# Patient Record
Sex: FEMALE | Race: OTHER | NOT HISPANIC OR LATINO
[De-identification: names, ages, dates, MRNs, and addresses within clinical notes are randomized per-mention and may not be internally consistent; named-entity substitution may affect disease eponyms.]

---

## 2018-03-02 ENCOUNTER — APPOINTMENT (OUTPATIENT)
Dept: ANTEPARTUM | Facility: CLINIC | Age: 33
End: 2018-03-02

## 2018-03-09 ENCOUNTER — ASOB RESULT (OUTPATIENT)
Age: 33
End: 2018-03-09

## 2018-03-09 ENCOUNTER — APPOINTMENT (OUTPATIENT)
Dept: ANTEPARTUM | Facility: CLINIC | Age: 33
End: 2018-03-09
Payer: COMMERCIAL

## 2018-03-09 PROCEDURE — 76811 OB US DETAILED SNGL FETUS: CPT

## 2018-03-09 PROCEDURE — 76817 TRANSVAGINAL US OBSTETRIC: CPT

## 2018-03-12 ENCOUNTER — APPOINTMENT (OUTPATIENT)
Dept: ANTEPARTUM | Facility: CLINIC | Age: 33
End: 2018-03-12

## 2018-03-16 ENCOUNTER — APPOINTMENT (OUTPATIENT)
Dept: ANTEPARTUM | Facility: CLINIC | Age: 33
End: 2018-03-16
Payer: COMMERCIAL

## 2018-03-16 ENCOUNTER — ASOB RESULT (OUTPATIENT)
Age: 33
End: 2018-03-16

## 2018-03-16 PROCEDURE — 76816 OB US FOLLOW-UP PER FETUS: CPT

## 2018-07-16 ENCOUNTER — APPOINTMENT (OUTPATIENT)
Dept: ANTEPARTUM | Facility: CLINIC | Age: 33
End: 2018-07-16

## 2018-07-22 ENCOUNTER — TRANSCRIPTION ENCOUNTER (OUTPATIENT)
Age: 33
End: 2018-07-22

## 2018-07-23 ENCOUNTER — INPATIENT (INPATIENT)
Facility: HOSPITAL | Age: 33
LOS: 1 days | Discharge: HOME | End: 2018-07-24
Attending: OBSTETRICS & GYNECOLOGY | Admitting: OBSTETRICS & GYNECOLOGY

## 2018-07-23 VITALS — TEMPERATURE: 98 F | SYSTOLIC BLOOD PRESSURE: 133 MMHG | DIASTOLIC BLOOD PRESSURE: 76 MMHG | HEART RATE: 73 BPM

## 2018-07-23 DIAGNOSIS — Z3A.00 WEEKS OF GESTATION OF PREGNANCY NOT SPECIFIED: ICD-10-CM

## 2018-07-23 DIAGNOSIS — O26.899 OTHER SPECIFIED PREGNANCY RELATED CONDITIONS, UNSPECIFIED TRIMESTER: ICD-10-CM

## 2018-07-23 LAB
BASOPHILS # BLD AUTO: 0.03 K/UL — SIGNIFICANT CHANGE UP (ref 0–0.2)
BASOPHILS NFR BLD AUTO: 0.2 % — SIGNIFICANT CHANGE UP (ref 0–2)
BLD GP AB SCN SERPL QL: NEGATIVE — SIGNIFICANT CHANGE UP
EOSINOPHIL # BLD AUTO: 0.03 K/UL — SIGNIFICANT CHANGE UP (ref 0–0.5)
EOSINOPHIL NFR BLD AUTO: 0.2 % — SIGNIFICANT CHANGE UP (ref 0–6)
HCT VFR BLD CALC: 42.3 % — SIGNIFICANT CHANGE UP (ref 34.5–45)
HGB BLD-MCNC: 14 G/DL — SIGNIFICANT CHANGE UP (ref 11.5–15.5)
IMM GRANULOCYTES # BLD AUTO: 0.09 # — SIGNIFICANT CHANGE UP
IMM GRANULOCYTES NFR BLD AUTO: 0.6 % — SIGNIFICANT CHANGE UP (ref 0–1.5)
LYMPHOCYTES # BLD AUTO: 1.69 K/UL — SIGNIFICANT CHANGE UP (ref 1–3.3)
LYMPHOCYTES # BLD AUTO: 12.1 % — LOW (ref 13–44)
MCHC RBC-ENTMCNC: 30.4 PG — SIGNIFICANT CHANGE UP (ref 27–34)
MCHC RBC-ENTMCNC: 33.1 % — SIGNIFICANT CHANGE UP (ref 32–36)
MCV RBC AUTO: 92 FL — SIGNIFICANT CHANGE UP (ref 80–100)
MONOCYTES # BLD AUTO: 1.09 K/UL — HIGH (ref 0–0.9)
MONOCYTES NFR BLD AUTO: 7.8 % — SIGNIFICANT CHANGE UP (ref 2–14)
NEUTROPHILS # BLD AUTO: 10.98 K/UL — HIGH (ref 1.8–7.4)
NEUTROPHILS NFR BLD AUTO: 79.1 % — HIGH (ref 43–77)
NRBC # FLD: 0 — SIGNIFICANT CHANGE UP
PLATELET # BLD AUTO: 159 K/UL — SIGNIFICANT CHANGE UP (ref 150–400)
PMV BLD: 11.3 FL — SIGNIFICANT CHANGE UP (ref 7–13)
RBC # BLD: 4.6 M/UL — SIGNIFICANT CHANGE UP (ref 3.8–5.2)
RBC # FLD: 13.6 % — SIGNIFICANT CHANGE UP (ref 10.3–14.5)
RH IG SCN BLD-IMP: POSITIVE — SIGNIFICANT CHANGE UP
WBC # BLD: 13.91 K/UL — HIGH (ref 3.8–10.5)
WBC # FLD AUTO: 13.91 K/UL — HIGH (ref 3.8–10.5)

## 2018-07-23 RX ORDER — PRAMOXINE HYDROCHLORIDE 150 MG/15G
1 AEROSOL, FOAM RECTAL EVERY 4 HOURS
Qty: 0 | Refills: 0 | Status: DISCONTINUED | OUTPATIENT
Start: 2018-07-23 | End: 2018-07-24

## 2018-07-23 RX ORDER — OXYTOCIN 10 UNIT/ML
333.33 VIAL (ML) INJECTION
Qty: 20 | Refills: 0 | Status: DISCONTINUED | OUTPATIENT
Start: 2018-07-23 | End: 2018-07-23

## 2018-07-23 RX ORDER — DOCUSATE SODIUM 100 MG
100 CAPSULE ORAL
Qty: 0 | Refills: 0 | Status: DISCONTINUED | OUTPATIENT
Start: 2018-07-23 | End: 2018-07-24

## 2018-07-23 RX ORDER — DIBUCAINE 1 %
1 OINTMENT (GRAM) RECTAL EVERY 4 HOURS
Qty: 0 | Refills: 0 | Status: DISCONTINUED | OUTPATIENT
Start: 2018-07-23 | End: 2018-07-24

## 2018-07-23 RX ORDER — HYDROCORTISONE 1 %
1 OINTMENT (GRAM) TOPICAL EVERY 4 HOURS
Qty: 0 | Refills: 0 | Status: DISCONTINUED | OUTPATIENT
Start: 2018-07-23 | End: 2018-07-24

## 2018-07-23 RX ORDER — ACETAMINOPHEN 500 MG
975 TABLET ORAL EVERY 6 HOURS
Qty: 0 | Refills: 0 | Status: DISCONTINUED | OUTPATIENT
Start: 2018-07-23 | End: 2018-07-24

## 2018-07-23 RX ORDER — LANOLIN
1 OINTMENT (GRAM) TOPICAL EVERY 6 HOURS
Qty: 0 | Refills: 0 | Status: DISCONTINUED | OUTPATIENT
Start: 2018-07-23 | End: 2018-07-24

## 2018-07-23 RX ORDER — OXYCODONE AND ACETAMINOPHEN 5; 325 MG/1; MG/1
2 TABLET ORAL EVERY 6 HOURS
Qty: 0 | Refills: 0 | Status: DISCONTINUED | OUTPATIENT
Start: 2018-07-23 | End: 2018-07-23

## 2018-07-23 RX ORDER — AER TRAVELER 0.5 G/1
1 SOLUTION RECTAL; TOPICAL EVERY 4 HOURS
Qty: 0 | Refills: 0 | Status: DISCONTINUED | OUTPATIENT
Start: 2018-07-23 | End: 2018-07-24

## 2018-07-23 RX ORDER — OXYCODONE HYDROCHLORIDE 5 MG/1
5 TABLET ORAL
Qty: 0 | Refills: 0 | Status: DISCONTINUED | OUTPATIENT
Start: 2018-07-23 | End: 2018-07-24

## 2018-07-23 RX ORDER — SODIUM CHLORIDE 9 MG/ML
1000 INJECTION, SOLUTION INTRAVENOUS
Qty: 0 | Refills: 0 | Status: DISCONTINUED | OUTPATIENT
Start: 2018-07-23 | End: 2018-07-23

## 2018-07-23 RX ORDER — ACETAMINOPHEN 500 MG
650 TABLET ORAL EVERY 6 HOURS
Qty: 0 | Refills: 0 | Status: DISCONTINUED | OUTPATIENT
Start: 2018-07-23 | End: 2018-07-23

## 2018-07-23 RX ORDER — MAGNESIUM HYDROXIDE 400 MG/1
30 TABLET, CHEWABLE ORAL
Qty: 0 | Refills: 0 | Status: DISCONTINUED | OUTPATIENT
Start: 2018-07-23 | End: 2018-07-24

## 2018-07-23 RX ORDER — OXYCODONE HYDROCHLORIDE 5 MG/1
5 TABLET ORAL EVERY 4 HOURS
Qty: 0 | Refills: 0 | Status: DISCONTINUED | OUTPATIENT
Start: 2018-07-23 | End: 2018-07-24

## 2018-07-23 RX ORDER — SODIUM CHLORIDE 9 MG/ML
1000 INJECTION, SOLUTION INTRAVENOUS ONCE
Qty: 0 | Refills: 0 | Status: DISCONTINUED | OUTPATIENT
Start: 2018-07-23 | End: 2018-07-23

## 2018-07-23 RX ORDER — DIPHENHYDRAMINE HCL 50 MG
25 CAPSULE ORAL EVERY 6 HOURS
Qty: 0 | Refills: 0 | Status: DISCONTINUED | OUTPATIENT
Start: 2018-07-23 | End: 2018-07-24

## 2018-07-23 RX ORDER — GLYCERIN ADULT
1 SUPPOSITORY, RECTAL RECTAL AT BEDTIME
Qty: 0 | Refills: 0 | Status: DISCONTINUED | OUTPATIENT
Start: 2018-07-23 | End: 2018-07-24

## 2018-07-23 RX ORDER — HYDROCORTISONE 1 %
1 OINTMENT (GRAM) TOPICAL EVERY 4 HOURS
Qty: 0 | Refills: 0 | Status: DISCONTINUED | OUTPATIENT
Start: 2018-07-23 | End: 2018-07-23

## 2018-07-23 RX ORDER — OXYTOCIN 10 UNIT/ML
333.33 VIAL (ML) INJECTION
Qty: 20 | Refills: 0 | Status: COMPLETED | OUTPATIENT
Start: 2018-07-23

## 2018-07-23 RX ORDER — SODIUM CHLORIDE 9 MG/ML
3 INJECTION INTRAMUSCULAR; INTRAVENOUS; SUBCUTANEOUS EVERY 8 HOURS
Qty: 0 | Refills: 0 | Status: DISCONTINUED | OUTPATIENT
Start: 2018-07-23 | End: 2018-07-24

## 2018-07-23 RX ORDER — PRAMOXINE HYDROCHLORIDE 150 MG/15G
1 AEROSOL, FOAM RECTAL EVERY 4 HOURS
Qty: 0 | Refills: 0 | Status: DISCONTINUED | OUTPATIENT
Start: 2018-07-23 | End: 2018-07-23

## 2018-07-23 RX ORDER — SIMETHICONE 80 MG/1
80 TABLET, CHEWABLE ORAL EVERY 6 HOURS
Qty: 0 | Refills: 0 | Status: DISCONTINUED | OUTPATIENT
Start: 2018-07-23 | End: 2018-07-24

## 2018-07-23 RX ORDER — KETOROLAC TROMETHAMINE 30 MG/ML
30 SYRINGE (ML) INJECTION ONCE
Qty: 0 | Refills: 0 | Status: DISCONTINUED | OUTPATIENT
Start: 2018-07-23 | End: 2018-07-23

## 2018-07-23 RX ORDER — OXYTOCIN 10 UNIT/ML
41.67 VIAL (ML) INJECTION
Qty: 20 | Refills: 0 | Status: DISCONTINUED | OUTPATIENT
Start: 2018-07-23 | End: 2018-07-23

## 2018-07-23 RX ORDER — IBUPROFEN 200 MG
600 TABLET ORAL EVERY 6 HOURS
Qty: 0 | Refills: 0 | Status: DISCONTINUED | OUTPATIENT
Start: 2018-07-23 | End: 2018-07-24

## 2018-07-23 RX ORDER — TETANUS TOXOID, REDUCED DIPHTHERIA TOXOID AND ACELLULAR PERTUSSIS VACCINE, ADSORBED 5; 2.5; 8; 8; 2.5 [IU]/.5ML; [IU]/.5ML; UG/.5ML; UG/.5ML; UG/.5ML
0.5 SUSPENSION INTRAMUSCULAR ONCE
Qty: 0 | Refills: 0 | Status: DISCONTINUED | OUTPATIENT
Start: 2018-07-23 | End: 2018-07-24

## 2018-07-23 RX ADMIN — SODIUM CHLORIDE 3 MILLILITER(S): 9 INJECTION INTRAMUSCULAR; INTRAVENOUS; SUBCUTANEOUS at 07:39

## 2018-07-23 RX ADMIN — SODIUM CHLORIDE 250 MILLILITER(S): 9 INJECTION, SOLUTION INTRAVENOUS at 03:29

## 2018-07-23 RX ADMIN — Medication 125 MILLIUNIT(S)/MIN: at 03:50

## 2018-07-23 NOTE — DISCHARGE NOTE OB - CARE PROVIDER_API CALL
Maria Teresa Main), Gynecology Obstetrics  Gynecology  [address] [place] [address]  Phone: [phone]  Fax: [phone]

## 2018-07-23 NOTE — DISCHARGE NOTE OB - MATERIALS PROVIDED
Breastfeeding Mother’s Support Group Information/Guide to Postpartum Care/[facility] [place] Screening Program/[place]  Immunization Record/Vaccinations/Breastfeeding Guide and Packet/Birth Certificate Instructions/Tdap Vaccination (VIS Pub Date: 2012)/[facility] Hearing Screen Program/Breastfeeding Log

## 2018-07-23 NOTE — DISCHARGE NOTE OB - CARE PLAN
Principal Discharge DX:	 (spontaneous vaginal delivery)  Goal:	Routine postpartum care  Assessment and plan of treatment:	Stable  Routine care

## 2018-07-23 NOTE — DISCHARGE NOTE OB - PATIENT PORTAL LINK FT
You can access the [email or web address], offered by [facility], by registering with the following website: [email or web address]

## 2018-07-24 VITALS
RESPIRATION RATE: 18 BRPM | HEART RATE: 60 BPM | TEMPERATURE: 98 F | OXYGEN SATURATION: 99 % | DIASTOLIC BLOOD PRESSURE: 60 MMHG | SYSTOLIC BLOOD PRESSURE: 100 MMHG

## 2018-07-24 LAB — T PALLIDUM AB TITR SER: NEGATIVE — SIGNIFICANT CHANGE UP

## 2018-07-24 NOTE — PROGRESS NOTE ADULT - SUBJECTIVE AND OBJECTIVE BOX
Post-partum Note,   She is a  32y woman who is now post-partum day: 1    Subjective:  The patient feels well.  She is ambulating.   She is tolerating regular diet.  She denies nausea and vomiting; denies fever.  She is voiding.  Her pain is controlled.  She reports normal postpartum bleeding.  She is breastfeeding.  She is formula feeding.    Physical exam:    Vital Signs Last 24 Hrs  T(C): 36.6 (2018 05:38), Max: 36.6 (2018 05:38)  T(F): 97.8 (2018 05:38), Max: 97.8 (2018 05:38)  HR: 60 (2018 05:38) (60 - 70)  BP: 100/60 (2018 05:38) (100/60 - 110/70)  BP(mean): --  RR: 18 (2018 05:38) (18 - 18)  SpO2: 99% (2018 05:38) (99% - 100%)    Gen: NAD  Breast: Soft, nontender, not engorged.  Abdomen: Soft, nontender, no distension , firm uterine fundus at umbilicus.  Pelvic: Normal lochia noted  Ext: No calf tenderness    LABS:                        14.0   13.91 )-----------( 159      ( 2018 02:25 )             42.3       Rubella status:     Allergies    No Known Allergies    Intolerances      MEDICATIONS  (STANDING):  acetaminophen   Tablet. 975 milliGRAM(s) Oral every 6 hours  diphtheria/tetanus/pertussis (acellular) Vaccine (ADAcel) 0.5 milliLiter(s) IntraMuscular once  oxyCODONE    IR 5 milliGRAM(s) Oral every 3 hours  prenatal multivitamin 1 Tablet(s) Oral daily  sodium chloride 0.9% lock flush 3 milliLiter(s) IV Push every 8 hours    MEDICATIONS  (PRN):  dibucaine 1% Ointment 1 Application(s) Topical every 4 hours PRN Perineal Discomfort  diphenhydrAMINE   Capsule 25 milliGRAM(s) Oral every 6 hours PRN Itching  docusate sodium 100 milliGRAM(s) Oral two times a day PRN Stool Softening  glycerin Suppository - Adult 1 Suppository(s) Rectal at bedtime PRN Constipation  hydrocortisone 1% Cream 1 Application(s) Topical every 4 hours PRN perineal discomfort  ibuprofen  Tablet 600 milliGRAM(s) Oral every 6 hours PRN Moderate Pain  lanolin Ointment 1 Application(s) Topical every 6 hours PRN Sore Nipples  magnesium hydroxide Suspension 30 milliLiter(s) Oral two times a day PRN Constipation  oxyCODONE    IR 5 milliGRAM(s) Oral every 4 hours PRN Severe Pain (7 -10)  pramoxine 1%/zinc 5% Cream 1 Application(s) Topical every 4 hours PRN perineal discomfort  simethicone 80 milliGRAM(s) Chew every 6 hours PRN Gas  witch hazel Pads 1 Application(s) Topical every 4 hours PRN Perineal Discomfort        Assessment and Plan  PPD #1 s/p   Doing well.  Encourage ambulation.  PP & PPD Instructions reviewed.  CPC.  D/C home tomorrow.

## 2019-06-26 NOTE — PATIENT PROFILE OB - MENTAL HEALTH CONDITIONS/SYMPTOMS, PROFILE
Ventricular Rate : 80  Atrial Rate : 80  P-R Interval : 166  QRS Duration : 68  Q-T Interval : 356  QTC Calculation(Bezet) : 410  P Axis : 28  R Axis : 7  T Axis : 31  Diagnosis : Normal sinus rhythm  Normal ECG  No previous ECGs available  Confirmed by DHEERAJ HARTMANN ([id number]) on 6/25/2019 10:40:10 PM  
none

## 2023-01-16 ENCOUNTER — NON-APPOINTMENT (OUTPATIENT)
Age: 38
End: 2023-01-16

## 2023-05-14 ENCOUNTER — NON-APPOINTMENT (OUTPATIENT)
Age: 38
End: 2023-05-14

## 2024-10-12 ENCOUNTER — ASOB RESULT (OUTPATIENT)
Age: 39
End: 2024-10-12

## 2024-10-12 ENCOUNTER — APPOINTMENT (OUTPATIENT)
Dept: ANTEPARTUM | Facility: CLINIC | Age: 39
End: 2024-10-12
Payer: COMMERCIAL

## 2024-10-12 PROCEDURE — 76801 OB US < 14 WKS SINGLE FETUS: CPT

## 2024-10-12 PROCEDURE — 76813 OB US NUCHAL MEAS 1 GEST: CPT

## 2024-10-12 PROCEDURE — 36415 COLL VENOUS BLD VENIPUNCTURE: CPT

## 2024-12-07 ENCOUNTER — ASOB RESULT (OUTPATIENT)
Age: 39
End: 2024-12-07

## 2024-12-07 ENCOUNTER — APPOINTMENT (OUTPATIENT)
Dept: ANTEPARTUM | Facility: CLINIC | Age: 39
End: 2024-12-07

## 2024-12-07 PROCEDURE — 76811 OB US DETAILED SNGL FETUS: CPT

## 2025-04-22 ENCOUNTER — INPATIENT (INPATIENT)
Facility: HOSPITAL | Age: 40
LOS: 1 days | Discharge: HOME | End: 2025-04-24
Attending: OBSTETRICS & GYNECOLOGY | Admitting: OBSTETRICS & GYNECOLOGY

## 2025-04-22 ENCOUNTER — APPOINTMENT (OUTPATIENT)
Dept: ANTEPARTUM | Facility: CLINIC | Age: 40
End: 2025-04-22

## 2025-04-22 VITALS
TEMPERATURE: 98 F | RESPIRATION RATE: 16 BRPM | OXYGEN SATURATION: 98 % | HEART RATE: 77 BPM | DIASTOLIC BLOOD PRESSURE: 58 MMHG | SYSTOLIC BLOOD PRESSURE: 117 MMHG

## 2025-04-22 DIAGNOSIS — O26.899 OTHER SPECIFIED PREGNANCY RELATED CONDITIONS, UNSPECIFIED TRIMESTER: ICD-10-CM

## 2025-04-22 LAB
BASOPHILS # BLD AUTO: 0.02 K/UL — SIGNIFICANT CHANGE UP (ref 0–0.2)
BASOPHILS NFR BLD AUTO: 0.2 % — SIGNIFICANT CHANGE UP (ref 0–2)
EOSINOPHIL # BLD AUTO: 0.01 K/UL — SIGNIFICANT CHANGE UP (ref 0–0.5)
EOSINOPHIL NFR BLD AUTO: 0.1 % — SIGNIFICANT CHANGE UP (ref 0–6)
HCT VFR BLD CALC: 42.7 % — SIGNIFICANT CHANGE UP (ref 34.5–45)
HGB BLD-MCNC: 14.3 G/DL — SIGNIFICANT CHANGE UP (ref 11.5–15.5)
IANC: 10.52 K/UL — HIGH (ref 1.8–7.4)
IMM GRANULOCYTES NFR BLD AUTO: 0.6 % — SIGNIFICANT CHANGE UP (ref 0–0.9)
LYMPHOCYTES # BLD AUTO: 1.02 K/UL — SIGNIFICANT CHANGE UP (ref 1–3.3)
LYMPHOCYTES # BLD AUTO: 8.2 % — LOW (ref 13–44)
MCHC RBC-ENTMCNC: 30.6 PG — SIGNIFICANT CHANGE UP (ref 27–34)
MCHC RBC-ENTMCNC: 33.5 G/DL — SIGNIFICANT CHANGE UP (ref 32–36)
MCV RBC AUTO: 91.4 FL — SIGNIFICANT CHANGE UP (ref 80–100)
MONOCYTES # BLD AUTO: 0.83 K/UL — SIGNIFICANT CHANGE UP (ref 0–0.9)
MONOCYTES NFR BLD AUTO: 6.7 % — SIGNIFICANT CHANGE UP (ref 2–14)
NEUTROPHILS # BLD AUTO: 10.52 K/UL — HIGH (ref 1.8–7.4)
NEUTROPHILS NFR BLD AUTO: 84.2 % — HIGH (ref 43–77)
NRBC # BLD AUTO: 0 K/UL — SIGNIFICANT CHANGE UP (ref 0–0)
NRBC # FLD: 0 K/UL — SIGNIFICANT CHANGE UP (ref 0–0)
NRBC BLD AUTO-RTO: 0 /100 WBCS — SIGNIFICANT CHANGE UP (ref 0–0)
PLATELET # BLD AUTO: 185 K/UL — SIGNIFICANT CHANGE UP (ref 150–400)
RBC # BLD: 4.67 M/UL — SIGNIFICANT CHANGE UP (ref 3.8–5.2)
RBC # FLD: 14.2 % — SIGNIFICANT CHANGE UP (ref 10.3–14.5)
WBC # BLD: 12.47 K/UL — HIGH (ref 3.8–10.5)
WBC # FLD AUTO: 12.47 K/UL — HIGH (ref 3.8–10.5)

## 2025-04-22 PROCEDURE — 76815 OB US LIMITED FETUS(S): CPT | Mod: 26

## 2025-04-22 RX ORDER — MODIFIED LANOLIN 100 %
1 CREAM (GRAM) TOPICAL EVERY 6 HOURS
Refills: 0 | Status: DISCONTINUED | OUTPATIENT
Start: 2025-04-22 | End: 2025-04-24

## 2025-04-22 RX ORDER — MAGNESIUM HYDROXIDE 400 MG/5ML
30 SUSPENSION ORAL
Refills: 0 | Status: DISCONTINUED | OUTPATIENT
Start: 2025-04-22 | End: 2025-04-24

## 2025-04-22 RX ORDER — WITCH HAZEL LEAF
1 FLUID EXTRACT MISCELLANEOUS EVERY 4 HOURS
Refills: 0 | Status: DISCONTINUED | OUTPATIENT
Start: 2025-04-22 | End: 2025-04-24

## 2025-04-22 RX ORDER — SODIUM CHLORIDE 9 G/1000ML
1000 INJECTION, SOLUTION INTRAVENOUS
Refills: 0 | Status: DISCONTINUED | OUTPATIENT
Start: 2025-04-22 | End: 2025-04-23

## 2025-04-22 RX ORDER — INFLUENZA A VIRUS A/IDAHO/07/2018 (H1N1) ANTIGEN (MDCK CELL DERIVED, PROPIOLACTONE INACTIVATED, INFLUENZA A VIRUS A/INDIANA/08/2018 (H3N2) ANTIGEN (MDCK CELL DERIVED, PROPIOLACTONE INACTIVATED), INFLUENZA B VIRUS B/SINGAPORE/INFTT-16-0610/2016 ANTIGEN (MDCK CELL DERIVED, PROPIOLACTONE INACTIVATED), INFLUENZA B VIRUS B/IOWA/06/2017 ANTIGEN (MDCK CELL DERIVED, PROPIOLACTONE INACTIVATED) 15; 15; 15; 15 UG/.5ML; UG/.5ML; UG/.5ML; UG/.5ML
0.5 INJECTION, SUSPENSION INTRAMUSCULAR ONCE
Refills: 0 | Status: COMPLETED | OUTPATIENT
Start: 2025-04-22 | End: 2025-04-22

## 2025-04-22 RX ORDER — PRENATAL 136/IRON/FOLIC ACID 27 MG-1 MG
1 TABLET ORAL DAILY
Refills: 0 | Status: DISCONTINUED | OUTPATIENT
Start: 2025-04-22 | End: 2025-04-24

## 2025-04-22 RX ORDER — OXYCODONE HYDROCHLORIDE 30 MG/1
5 TABLET ORAL ONCE
Refills: 0 | Status: DISCONTINUED | OUTPATIENT
Start: 2025-04-22 | End: 2025-04-24

## 2025-04-22 RX ORDER — OXYTOCIN-SODIUM CHLORIDE 0.9% IV SOLN 30 UNIT/500ML 30-0.9/5 UT/ML-%
167 SOLUTION INTRAVENOUS
Qty: 30 | Refills: 0 | Status: DISCONTINUED | OUTPATIENT
Start: 2025-04-22 | End: 2025-04-23

## 2025-04-22 RX ORDER — ACETAMINOPHEN 500 MG/5ML
975 LIQUID (ML) ORAL
Refills: 0 | Status: DISCONTINUED | OUTPATIENT
Start: 2025-04-22 | End: 2025-04-24

## 2025-04-22 RX ORDER — HYDROCORTISONE 10 MG/G
1 CREAM TOPICAL EVERY 6 HOURS
Refills: 0 | Status: DISCONTINUED | OUTPATIENT
Start: 2025-04-22 | End: 2025-04-24

## 2025-04-22 RX ORDER — DIPHENHYDRAMINE HCL 12.5MG/5ML
25 ELIXIR ORAL EVERY 6 HOURS
Refills: 0 | Status: DISCONTINUED | OUTPATIENT
Start: 2025-04-22 | End: 2025-04-24

## 2025-04-22 RX ORDER — CLOSTRIDIUM TETANI TOXOID ANTIGEN (FORMALDEHYDE INACTIVATED), CORYNEBACTERIUM DIPHTHERIAE TOXOID ANTIGEN (FORMALDEHYDE INACTIVATED), BORDETELLA PERTUSSIS TOXOID ANTIGEN (GLUTARALDEHYDE INACTIVATED), BORDETELLA PERTUSSIS FILAMENTOUS HEMAGGLUTININ ANTIGEN (FORMALDEHYDE INACTIVATED), BORDETELLA PERTUSSIS PERTACTIN ANTIGEN, AND BORDETELLA PERTUSSIS FIMBRIAE 2/3 ANTIGEN 5; 2; 2.5; 5; 3; 5 [LF]/.5ML; [LF]/.5ML; UG/.5ML; UG/.5ML; UG/.5ML; UG/.5ML
0.5 INJECTION, SUSPENSION INTRAMUSCULAR ONCE
Refills: 0 | Status: DISCONTINUED | OUTPATIENT
Start: 2025-04-22 | End: 2025-04-24

## 2025-04-22 RX ORDER — OXYCODONE HYDROCHLORIDE 30 MG/1
5 TABLET ORAL
Refills: 0 | Status: DISCONTINUED | OUTPATIENT
Start: 2025-04-22 | End: 2025-04-24

## 2025-04-22 RX ORDER — IBUPROFEN 200 MG
600 TABLET ORAL EVERY 6 HOURS
Refills: 0 | Status: COMPLETED | OUTPATIENT
Start: 2025-04-22 | End: 2026-03-21

## 2025-04-22 RX ORDER — DIBUCAINE 10 MG/G
1 OINTMENT TOPICAL EVERY 6 HOURS
Refills: 0 | Status: DISCONTINUED | OUTPATIENT
Start: 2025-04-22 | End: 2025-04-24

## 2025-04-22 RX ORDER — BENZOCAINE 220 MG/G
1 SPRAY, METERED PERIODONTAL EVERY 6 HOURS
Refills: 0 | Status: DISCONTINUED | OUTPATIENT
Start: 2025-04-22 | End: 2025-04-24

## 2025-04-22 RX ORDER — KETOROLAC TROMETHAMINE 30 MG/ML
30 INJECTION, SOLUTION INTRAMUSCULAR; INTRAVENOUS ONCE
Refills: 0 | Status: DISCONTINUED | OUTPATIENT
Start: 2025-04-22 | End: 2025-04-24

## 2025-04-22 RX ORDER — SIMETHICONE 80 MG
80 TABLET,CHEWABLE ORAL EVERY 4 HOURS
Refills: 0 | Status: DISCONTINUED | OUTPATIENT
Start: 2025-04-22 | End: 2025-04-24

## 2025-04-22 RX ORDER — PRAMOXINE HCL 1 %
1 GEL (GRAM) TOPICAL EVERY 4 HOURS
Refills: 0 | Status: DISCONTINUED | OUTPATIENT
Start: 2025-04-22 | End: 2025-04-24

## 2025-04-22 RX ADMIN — SODIUM CHLORIDE 125 MILLILITER(S): 9 INJECTION, SOLUTION INTRAVENOUS at 22:27

## 2025-04-22 RX ADMIN — Medication 1 APPLICATION(S): at 19:00

## 2025-04-22 NOTE — OB RN PATIENT PROFILE - NS_OBGYNHISTORY_OBGYN_ALL_OB_FT
AP course uncomplicated   OB:  x 4      2010 7# 5      2014 7#8      12/10/2015 7#6      2018 7# 9   GYN: Denies    GBS negative

## 2025-04-22 NOTE — OB PROVIDER DELIVERY SUMMARY - NSPROVIDERDELIVERYNOTE_OBGYN_ALL_OB_FT
Patient found to be fully dilated, counseled once more on concern for fetal size and risk of impending shoulder dystocia  Explained to patient again the risks including but not limited to needing to perform emergency episiotomy, intentional fetal clavicular fracture to emergently deliver fetus, NICU admission, death  Discussed NICU to be present as well as additional RN's, safety officers aware of shoulder dystocia risk  Patient made rapid progress with pushing, bed broken and patient placed in Garcia with stool at bedside  NICU present  Fetus delivered JENNY position, shoulder dystocia quickly called (left shoulder impacted)  Failed to deliver with suprapubic pressure, woods cork screw maneuvers  Achieved delivery with posterior arm (right arm)  Cord immediately clamped/cut   Male fetus to NICU MD for assessment, no palpable crepitus in clavicles or humerus, symmetrical govind reflex, good tone bilaterally and strong cry with excellent respiratory effort  Apgars 8/9  9#5oz  Cord confirmed 3VC  Placenta delivered spontaneously  Methergine IM given due to grand multiparity and clots evacuated on bimanual, otherwise no trailing membrane appreciated  Patient and partner briefed on above events in detail, they expressed gratitude and understanding of events  Patient and baby boy doing well at conclusion of delivery, excellent hemostasis, uterus firm/below umbilicus, baby to well baby nursery per NICU

## 2025-04-22 NOTE — OB PROVIDER DELIVERY SUMMARY - NSPROVNAME_OBGYN_ALL_OB_FT
Level one peds was already present for shoulder precautions but then at 2248 NICU resus team was called and Dr Fuentes

## 2025-04-22 NOTE — OB PROVIDER LABOR PROGRESS NOTE - NS_SUBJECTIVE/OBJECTIVE_OBGYN_ALL_OB_FT
Patient seen and examined for cervical change.  States contractions are stronger but denies need for pain meds    FHTs moderate variability with contractions q 2 mins    CX: 8/100/-2 BBOW    41 weeks active labor in grand multip  -no LDRs available presently. To deliver in triage  -will set up delivery table in anticipation  -plan of care reviewed  - extra personnel available for possibel SD risk at efw of 4104g.  Patient aware of plan of care
pt feeling some pressure  no epi, tolerating well, does not want anesthesia consulted

## 2025-04-22 NOTE — OB PROVIDER H&P - HISTORY OF PRESENT ILLNESS
PNC: WHP     39 year old  at 41.2 presents to triage with complaints of painful contractions every 2 minutes pain 8-9/10 does not desire pain medication. Denies leaking of fluid, vaginal bleeding, reports good fetal movement. Denies ob complications. Was previously scheduled for IOL but chose to wait await labor.

## 2025-04-22 NOTE — OB PROVIDER H&P - NS_OBGYNHISTORY_OBGYN_ALL_OB_FT
x 4  2010 AP course uncomplicated   OB:  x 4      2010 7# 5      2014 7#8      12/10/2015 7#6      2018 7# 9   GYN: Denies    GBS negative

## 2025-04-22 NOTE — OB PROVIDER DELIVERY SUMMARY - NSSELHIDDEN_OBGYN_ALL_OB_FT
[NS_DeliveryAttending1_OBGYN_ALL_OB_FT:[id number]=],[NS_DeliveryRN_OBGYN_ALL_OB_FT:[id number]=]

## 2025-04-22 NOTE — OB RN DELIVERY SUMMARY - NSSELHIDDEN_OBGYN_ALL_OB_FT
[NS_DeliveryAttending1_OBGYN_ALL_OB_FT:[id number]=],[NS_DeliveryRN_OBGYN_ALL_OB_FT:[id number]=]

## 2025-04-22 NOTE — OB PROVIDER H&P - ASSESSMENT
39 year old  at 41.2 admitted to labor and delivery for labor     Plan discussed with Dr. Patten  -routine orders placed  -for expectant management   -patient does not desire epidural   -GBS negative  -2uPRBC on hold     Risks, benefits, alternatives, and possible complications have been discussed in detail with the patient in her native language. Pre-admission, admission, and post admission procedures and expectations were discussed in detail. All questions answered, all appropriate hospital consents were signed. Anticipate normal vaginal delivery.   Informed consent was obtained. The following was discussed:   - Induction/augmentation of labor: use of medication and/or cook balloon to begin or enhance labor   - Obstetrical management including internal fetal/contraction monitoring   - Normal vaginal delivery   - Possible  section

## 2025-04-22 NOTE — OB PROVIDER H&P - NSLOWPPHRISK_OBGYN_A_OB
No previous uterine incision/Jane Pregnancy/Less than or equal to 4 previous vaginal births/No known bleeding disorder/No history of postpartum hemorrhage

## 2025-04-22 NOTE — OB PROVIDER DELIVERY SUMMARY - NSPROCMANEUVERSA_OBGYN_ALL_OB
Suprapubic pressure/Garcia (Legs flexed back)/Woods Corkscrew/Delivery of posterior arm/Fundal pressure applied

## 2025-04-22 NOTE — OB PROVIDER DELIVERY SUMMARY - NSMODPPHRISK_OBGYN_A_OB
Over-distended uterus: Multiple gestation, polyhydramnios, Macrosomia/More than 4 previous vaginal births/AMA - over 35 years of age

## 2025-04-22 NOTE — OB PROVIDER LABOR PROGRESS NOTE - ASSESSMENT
P4 w/ susp LGA fetus in labor, counseled on shoulder dystocia precautions, agreeable to AROM    Plan:  I first contacted her on arrival to my shift and outlined in plain language what a shoulder dystocia is, how it could affect her and the baby in terms of serious/irreversible morbidity and mortality   I explained that shoulder dystocia is unpredictable and there are not always signs it can happen in labor and that at this estimated fetal weight, there is a higher chance it could happen during her delivery   I explained that she is high risk for episiotomy if shoulder dystocia occurs and typical maneuvers are unsuccessful or if there in insufficient space to safely perform the necessary maneuvers, this intervention could lead to 4th degree laceration/chronic pelvic pain/fecal incontinence   I explained that additional RN's and peds would be in room in case of shoulder dystocia given the most recent growth sono and significantly larger expected fetal weight than her previous babies (approx 2# anticipated)  Pt has 2PIV and 2uPRBC on hold (grand multip, susp lga), h kit to be in room  RN present for above  Pt and partner verbalize clear understading of all discussed

## 2025-04-22 NOTE — OB RN DELIVERY SUMMARY - NS_SEPSISRSKCALC_OBGYN_ALL_OB_FT
EOS calculated successfully. EOS Risk Factor: 0.5/1000 live births ([facility] national incidence); GA=41w2d; Temp=98.2; ROM=1.95; GBS='Negative'; Antibiotics='No antibiotics or any antibiotics < 2 hrs prior to birth'

## 2025-04-22 NOTE — OB PROVIDER H&P - NSHPPHYSICALEXAM_GEN_ALL_CORE
Vital Signs Last 24 Hrs  T(C): 36.8 (22 Apr 2025 13:51), Max: 36.8 (22 Apr 2025 13:51)  T(F): 98.2 (22 Apr 2025 13:51), Max: 98.2 (22 Apr 2025 13:51)  HR: 60 (22 Apr 2025 15:27) (60 - 77)  BP: 109/62 (22 Apr 2025 15:27) (109/62 - 117/58)  BP(mean): --  RR: 16 (22 Apr 2025 13:51) (16 - 16)  SpO2: 98% (22 Apr 2025 13:51) (98% - 98%)    Assessment reveals VSS  General: Female sitting comfortably in no apparent distress  Neuro: No facial asymmetry, no slurred speech, moves all 4 extremities  Mood: Alert and lucid, appropriate mood and affect  A&Ox3  Lungs- clear bilateral  Heart- normal rate and regular rhythm  Extremities- Warm, Dry, no edema present, good pulses   Abdomen soft, NT, gravid  Transabdominal Ultrasound-cephalic, posterior placenta, m mode 175, mvp 4.39- images saved ASOB  Vaginal Exam- 5/90/-3  Exam Chaperoned by: IRON Louise   *NST  Baseline  (     140     ) BPM  Variability ( x )  Moderate   (  ) Minimal  (  ) Absent  (  )  Marked  Accelerations ( x ) 15x15   (  ) 10x10  (  ) no  Decelerations (x  ) no  (  ) Variable  (  ) Early  (  ) Late      Description _________  Contractions (  ) no  (x  ) yes     Description  _Q2-3 min _________  Interpretation ( x ) reactive   (  )  non-reactive

## 2025-04-22 NOTE — OB NEONATOLOGY/PEDIATRICIAN DELIVERY SUMMARY - NSPEDSNEONOTESA_OBGYN_ALL_OB_FT
Baby is a 41.3wk LGA male born to a 38 y/o  mother via . PEDS called to delivery for true R shoulder dystocia (about 35 seconds). NICU resus present. Maternal history uncomplicated. Prenatal history uncomplicated. Maternal blood type A+. PNL negative, non-reactive, and immune. GBS negative on 3/27. AROM at 2133, clear. Baby born with weak tone, color, and cry. Warmed, dried, stimulated. Apgars 6/9. Shoulder exam normal b/l, govind reflex symmetrical. EOS .08. Mom plans to breastfeed and declines hepB. Circ requested.

## 2025-04-22 NOTE — OB PROVIDER IHI INDUCTION/AUGMENTATION NOTE - LABOR: CERVICAL CONSISTENCY
Discharge Instructions


Date of Service


Oct 24, 2017.





Admission


Reason for Admission:  Shortness Of Breath





Discharge


Discharge Diagnosis / Problem:  ACUTE ON CHRONIC DIASTOLIC CHF





Discharge Goals


Goal(s):  Diagnostic testing, Therapeutic intervention





Activity Recommendations


Activity Level:  Assistance Required (ALWAYS WITH ASSISTANCE)


Therapies:  Physical Therapy, Occupational Therapy


FALL PRECAUTIONS PLEASE


.





Additional Information


Patient informed of condition:  Yes


Advance Directives:  No (UNKNOWN)


DNR:  No (PATIENT IS FULL CODE)


Level of Care:  Skilled


Communicable Disease:  No


Prognosis:  Improving


Oxygen at (LPM):  2 LITERS VIA NASAL CANNULA AS NEEDED, CPAP AT NIGHT





Instructions / Follow-Up


Instructions / Follow-Up


MONITOR VITAL SIGNS, VOLUME STATUS DAILY. 


MONITOR RENAL FUNCTION NOW THAT PATIENT IS ON LASIX TWICE A WEEK.


FALL PRECAUTIONS PLEASE.





FOLLOW UP WITH PRIMARY CARE PHYSICIAN DR. SUTTON ON TUESDAY OCTOBER 31, 2017 

AT 2:45PM.


FOLLOW UP WITH CARDIOLOGIST, NEUROLOGIST, NEPHROLOGIST, PSYCHIATRIST IN 2 WEEKS.








CPAP AT NIGHT AND WHILE SLEEPING:


Delivery Mode BiPap 


* CPAP


Respiratory Treatment Method


* Facial BiPAP Mask


Expiratory Airway Pressure


* 10 cm H2O


Leakage 


* 96 % (0-40) H


Oxygen Flow Rate


* 2.0 L/min


Duration Of Treatment 


* At Night


(EPAP) Patient 


* 10.0 cm H2O





PLEASE REFER TO ACCOMPANYING HOSPITAL DISCHARGE SUMMARY FOR FURTHER DETAILS.





Current Hospital Diet


Patient's current hospital diet: AHA Diet (Heart Healthy), Diabetes Type 2 Diet

, Renal Diet





Discharge Diet


Recommended Diet:  AHA Diet (Heart Healthy), Diabetes Type 2 Diet


Fluid Restriction:  2000 ml (8 cups)





Procedures


Procedures Performed:  


CT HEAD, CHEST CT, ECHOCARDIOGRAM





Pending Studies


Studies pending at discharge:  yes


List of pending studies:  


PLEASE REFER TO ACCOMPANYING HOSPITAL DISCHARGE SUMMARY.





Physician Orders On Transfer


Special Precautions:


MONITOR VITAL SIGNS, VOLUME STATUS DAILY. 


MONITOR RENAL FUNCTION NOW THAT PATIENT IS ON LASIX TWICE A WEEK.


FALL PRECAUTIONS PLEASE.





FOLLOW UP WITH PRIMARY CARE PHYSICIAN DR. SUTTON ON TUESDAY OCTOBER 31, 2017 

AT 2:45PM.


FOLLOW UP WITH CARDIOLOGIST, NEUROLOGIST, NEPHROLOGIST, PSYCHIATRIST IN 2 WEEKS.





CPAP AT NIGHT AND WHILE SLEEPING:


Delivery Mode BiPap 


* CPAP


Respiratory Treatment Method


* Facial BiPAP Mask


Expiratory Airway Pressure


* 10 cm H2O


Leakage 


* 96 % (0-40) H


Oxygen Flow Rate


* 2.0 L/min


Duration Of Treatment 


* At Night


(EPAP) Patient 


* 10.0 cm H2O





PLEASE REFER TO ACCOMPANYING HOSPITAL DISCHARGE SUMMARY FOR FURTHER DETAILS.





Laboratory Results





Hemoglobin A1c








Test


  10/16/17


06:08 Range/Units


 


 


Estimated Average Glucose 154   mg/dl


 


Hemoglobin A1c 7.0 H 4.5-5.6  %








Lipid Panel








Test


  10/16/17


06:08 Range/Units


 


 


Triglycerides Level 373 H 0-150  mg/dl


 


Cholesterol Level 133  0-200  mg/dl


 


HDL Cholesterol 33   mg/dl


 


Cholesterol/HDL Ratio 4.0   


 


LDL Cholesterol, Calculated 25   mg/dl











Medical Emergencies








.


Who to Call and When:





Medical Emergencies:  If at any time you feel your situation is an emergency, 

please call 911 immediately.





.





Non-Emergent Contact


Non-Emergency issues call your:  Primary Care Provider


Call Non-Emergent contact if:  you have a fever, your pain is not controlled, 

your pain is worsening, you have any medication questions





.


.








"Provider Documentation" section prepared by Jj Padilla.








.





Core Measure Problem


Core Measures:  None medium

## 2025-04-22 NOTE — OB PROVIDER DELIVERY SUMMARY - NSLOWPPHRISK_OBGYN_A_OB
No previous uterine incision/Jane Pregnancy/No known bleeding disorder/No history of postpartum hemorrhage

## 2025-04-22 NOTE — OB RN DELIVERY SUMMARY - NS_GENERALBABYACOMMENTA_OBGYN_ALL_OB_FT
Skin to skin delayed due to NICU resus team wanting to examine  at warmer after shoulder dystocia and after obtaining mothers consent

## 2025-04-23 ENCOUNTER — TRANSCRIPTION ENCOUNTER (OUTPATIENT)
Age: 40
End: 2025-04-23

## 2025-04-23 LAB — T PALLIDUM AB TITR SER: NEGATIVE — SIGNIFICANT CHANGE UP

## 2025-04-23 RX ORDER — ACETAMINOPHEN 500 MG/5ML
3 LIQUID (ML) ORAL
Qty: 0 | Refills: 0 | DISCHARGE
Start: 2025-04-23

## 2025-04-23 RX ORDER — IBUPROFEN 200 MG
1 TABLET ORAL
Qty: 0 | Refills: 0 | DISCHARGE
Start: 2025-04-23

## 2025-04-23 RX ORDER — PRENATAL 136/IRON/FOLIC ACID 27 MG-1 MG
0 TABLET ORAL
Refills: 0 | DISCHARGE

## 2025-04-23 RX ORDER — WITCH HAZEL LEAF
1 FLUID EXTRACT MISCELLANEOUS
Qty: 0 | Refills: 0 | DISCHARGE
Start: 2025-04-23

## 2025-04-23 RX ORDER — IBUPROFEN 200 MG
600 TABLET ORAL EVERY 6 HOURS
Refills: 0 | Status: DISCONTINUED | OUTPATIENT
Start: 2025-04-23 | End: 2025-04-24

## 2025-04-23 RX ORDER — DIBUCAINE 10 MG/G
1 OINTMENT TOPICAL
Qty: 0 | Refills: 0 | DISCHARGE
Start: 2025-04-23

## 2025-04-23 RX ADMIN — Medication 3 MILLILITER(S): at 22:13

## 2025-04-23 RX ADMIN — OXYTOCIN-SODIUM CHLORIDE 0.9% IV SOLN 30 UNIT/500ML 167 MILLIUNIT(S)/MIN: 30-0.9/5 SOLUTION at 00:36

## 2025-04-23 RX ADMIN — Medication 3 MILLILITER(S): at 06:02

## 2025-04-23 NOTE — DISCHARGE NOTE OB - PATIENT PORTAL LINK FT
You can access the FollowMyHealth Patient Portal offered by [facility] by registering at the following website: [email or web address]. By joining [employer]’s FollowMyHealth portal, you will also be able to view your health information using other applications (apps) compatible with our system.

## 2025-04-23 NOTE — DISCHARGE NOTE OB - FINANCIAL ASSISTANCE
[facility] provides services at a reduced cost to those who are determined to be eligible through [facility]’s financial assistance program. Information regarding [facility]’s financial assistance program can be found by going to [email or web address] or by calling [phone].

## 2025-04-23 NOTE — DISCHARGE NOTE OB - MATERIALS PROVIDED
[facility] [place] Screening Program/[place]  Immunization Record/Guide to Postpartum Care/Shaken Baby Prevention Handout/Discharge Medication Information for Patients and Families Pocket Guide

## 2025-04-23 NOTE — DISCHARGE NOTE OB - NS MD DC FALL RISK RISK
For information on Fall & Injury Prevention, visit: [email or web address] OR  [email or web address] OR  https://www.cdc.gov/steadi/patient.html

## 2025-04-23 NOTE — PROGRESS NOTE ADULT - ASSESSMENT
Assessment and Plan  PPD #1 s/p  c/b SD. QBL 243ml. s/p IM Methergine. Doing well pp.     Doing well postpartum  Increase ambulation.  Encourage breastfeeding.  Continue taking PO pain meds PRN    PP & PPD Instructions reviewed.  PP educational materials reviewed & provided     - Discharge planning      Discussed with MD Doreen Carrington

## 2025-04-23 NOTE — DISCHARGE NOTE OB - CARE PROVIDER_API CALL
Patrizia Mccarty  Obstetrics and Gynecology  [id number] [place] [address], [address] [address]  Phone: [phone]  Fax: [phone]  Follow Up Time:

## 2025-04-23 NOTE — DISCHARGE NOTE OB - CARE PLAN
1 Principal Discharge DX:	Shoulder dystocia, delivered  Assessment and plan of treatment:	Routine pp care

## 2025-04-24 VITALS
TEMPERATURE: 98 F | RESPIRATION RATE: 17 BRPM | HEART RATE: 64 BPM | DIASTOLIC BLOOD PRESSURE: 60 MMHG | OXYGEN SATURATION: 99 % | SYSTOLIC BLOOD PRESSURE: 101 MMHG

## 2025-04-24 LAB
BASOPHILS # BLD AUTO: 0.04 K/UL — SIGNIFICANT CHANGE UP (ref 0–0.2)
BASOPHILS NFR BLD AUTO: 0.4 % — SIGNIFICANT CHANGE UP (ref 0–2)
EOSINOPHIL # BLD AUTO: 0.11 K/UL — SIGNIFICANT CHANGE UP (ref 0–0.5)
EOSINOPHIL NFR BLD AUTO: 1 % — SIGNIFICANT CHANGE UP (ref 0–6)
HCT VFR BLD CALC: 36.9 % — SIGNIFICANT CHANGE UP (ref 34.5–45)
HGB BLD-MCNC: 12.3 G/DL — SIGNIFICANT CHANGE UP (ref 11.5–15.5)
IANC: 6.9 K/UL — SIGNIFICANT CHANGE UP (ref 1.8–7.4)
IMM GRANULOCYTES NFR BLD AUTO: 0.5 % — SIGNIFICANT CHANGE UP (ref 0–0.9)
LYMPHOCYTES # BLD AUTO: 27.5 % — SIGNIFICANT CHANGE UP (ref 13–44)
LYMPHOCYTES # BLD AUTO: 3.14 K/UL — SIGNIFICANT CHANGE UP (ref 1–3.3)
MCHC RBC-ENTMCNC: 30.6 PG — SIGNIFICANT CHANGE UP (ref 27–34)
MCHC RBC-ENTMCNC: 33.3 G/DL — SIGNIFICANT CHANGE UP (ref 32–36)
MCV RBC AUTO: 91.8 FL — SIGNIFICANT CHANGE UP (ref 80–100)
MONOCYTES # BLD AUTO: 1.17 K/UL — HIGH (ref 0–0.9)
MONOCYTES NFR BLD AUTO: 10.2 % — SIGNIFICANT CHANGE UP (ref 2–14)
NEUTROPHILS # BLD AUTO: 6.9 K/UL — SIGNIFICANT CHANGE UP (ref 1.8–7.4)
NEUTROPHILS NFR BLD AUTO: 60.4 % — SIGNIFICANT CHANGE UP (ref 43–77)
NRBC # BLD AUTO: 0 K/UL — SIGNIFICANT CHANGE UP (ref 0–0)
NRBC # FLD: 0 K/UL — SIGNIFICANT CHANGE UP (ref 0–0)
NRBC BLD AUTO-RTO: 0 /100 WBCS — SIGNIFICANT CHANGE UP (ref 0–0)
PLATELET # BLD AUTO: 183 K/UL — SIGNIFICANT CHANGE UP (ref 150–400)
RBC # BLD: 4.02 M/UL — SIGNIFICANT CHANGE UP (ref 3.8–5.2)
RBC # FLD: 14.4 % — SIGNIFICANT CHANGE UP (ref 10.3–14.5)
WBC # BLD: 11.42 K/UL — HIGH (ref 3.8–10.5)
WBC # FLD AUTO: 11.42 K/UL — HIGH (ref 3.8–10.5)

## 2025-04-24 RX ADMIN — Medication 3 MILLILITER(S): at 06:29

## 2025-04-24 RX ADMIN — Medication 3 MILLILITER(S): at 15:03

## 2025-04-24 NOTE — PROGRESS NOTE ADULT - ASSESSMENT
Assessment and Plan  PPD #2 s/p  c/b SD. QBL 243ml. s/p IM Methergine. Doing well pp.     Doing well postpartum  Increase ambulation.  Encourage breastfeeding.  Continue taking PO pain meds PRN    PP & PPD Instructions reviewed.  PP educational materials reviewed & provided     - Discharge planning, for d/c home today      Discussed with MD Domenica Carrington

## 2025-04-24 NOTE — PROGRESS NOTE ADULT - SUBJECTIVE AND OBJECTIVE BOX
Post-partum Note,   She is a  39y woman who is now post-partum day: 2    Subjective:  The patient feels well.  She is ambulating.   She is tolerating regular diet.  She denies nausea and vomiting; denies fever.  She is voiding.  Her pain is controlled.  She reports normal postpartum bleeding.  She is breastfeeding.  She is formula feeding.  She is bonding with infant.    Physical exam:    Vital Signs Last 24 Hrs  T(C): 36.8 (2025 06:23), Max: 37 (2025 18:58)  T(F): 98.3 (2025 06:23), Max: 98.6 (2025 18:58)  HR: 57 (2025 06:23) (57 - 66)  BP: 103/62 (2025 06:23) (99/63 - 103/62)  BP(mean): --  RR: 18 (2025 06:23) (18 - 18)  SpO2: 98% (2025 06:23) (98% - 98%)    Parameters below as of 2025 06:23  Patient On (Oxygen Delivery Method): room air        Gen: NAD  Breast: Soft, nontender, not engorged.  Abdomen: Soft, nontender, no distension , firm uterine fundus at umbilicus.  Pelvic: Normal lochia noted  Ext: No calf tenderness    LABS:                        12.3   11.42 )-----------( 183      ( 2025 05:55 )             36.9       Rubella status:     Allergies    No Known Allergies    Intolerances      MEDICATIONS  (STANDING):  acetaminophen     Tablet .. 975 milliGRAM(s) Oral <User Schedule>  diphtheria/tetanus/pertussis (acellular) Vaccine (Adacel) 0.5 milliLiter(s) IntraMuscular once  ibuprofen  Tablet. 600 milliGRAM(s) Oral every 6 hours  influenza   Vaccine 0.5 milliLiter(s) IntraMuscular once  ketorolac   Injectable 30 milliGRAM(s) IV Push once  prenatal multivitamin 1 Tablet(s) Oral daily  sodium chloride 0.9% lock flush 3 milliLiter(s) IV Push every 8 hours    MEDICATIONS  (PRN):  benzocaine 20%/menthol 0.5% Spray 1 Spray(s) Topical every 6 hours PRN for Perineal discomfort  dibucaine 1% Ointment 1 Application(s) Topical every 6 hours PRN Perineal discomfort  diphenhydrAMINE 25 milliGRAM(s) Oral every 6 hours PRN Pruritus  hydrocortisone 1% Cream 1 Application(s) Topical every 6 hours PRN Moderate Pain (4-6)  lanolin Ointment 1 Application(s) Topical every 6 hours PRN nipple soreness  magnesium hydroxide Suspension 30 milliLiter(s) Oral two times a day PRN Constipation  oxyCODONE    IR 5 milliGRAM(s) Oral every 3 hours PRN Moderate to Severe Pain (4-10)  oxyCODONE    IR 5 milliGRAM(s) Oral once PRN Moderate to Severe Pain (4-10)  pramoxine 1%/zinc 5% Cream 1 Application(s) Topical every 4 hours PRN Moderate Pain (4-6)  simethicone 80 milliGRAM(s) Chew every 4 hours PRN Gas  witch hazel Pads 1 Application(s) Topical every 4 hours PRN Perineal discomfort          Assessment and Plan  PPD #2 s/p . QBL     Doing well postpartum  Increase ambulation.  Encourage breastfeeding.  Continue PO Pain medications PRN    PP & PPD Instructions reviewed.  PP educational materials reviewed & provided     D/C home today  - Discharge home with instructions  -Follow up in office in 5-6 weeks for postpartum visit      Discussed with MD ---    WILI Carrington      
Post-partum Note,   She is a  39y woman who is now post-partum day: 1    Subjective:  The patient feels well.  She is ambulating.   She is tolerating regular diet.  She denies nausea and vomiting; denies fever.  She is voiding.  Her pain is controlled.  She reports normal postpartum bleeding.  She is breastfeeding.  She is formula feeding.  She is bonding with infant.    Physical exam:    Vital Signs Last 24 Hrs  T(C): 36.9 (2025 06:32), Max: 36.9 (2025 06:32)  T(F): 98.5 (2025 06:32), Max: 98.5 (2025 06:32)  HR: 64 (2025 06:32) (56 - 91)  BP: 101/59 (2025 06:32) (91/53 - 141/74)  BP(mean): --  RR: 18 (2025 06:32) (16 - 18)  SpO2: 98% (2025 06:32) (97% - 100%)    Parameters below as of 2025 06:32  Patient On (Oxygen Delivery Method): room air        Gen: NAD  Breast: Soft, nontender, not engorged.  Abdomen: Soft, nontender, no distension , firm uterine fundus at umbilicus.  Pelvic: Normal lochia noted  Ext: No calf tenderness    LABS:                        14.3   12.47 )-----------( 185      ( 2025 17:15 )             42.7     ABO Interpretation: A ( @ 17:41)  Rh Interpretation: Positive ( @ 17:41)  ABO Interpretation: A ( @ 17:38)  Rh Interpretation: Positive ( @ 17:38)  Antibody Screen: Negative ( @ 17:38)    Rubella status:     Allergies    No Known Allergies    Intolerances      MEDICATIONS  (STANDING):  acetaminophen     Tablet .. 975 milliGRAM(s) Oral <User Schedule>  diphtheria/tetanus/pertussis (acellular) Vaccine (Adacel) 0.5 milliLiter(s) IntraMuscular once  ibuprofen  Tablet. 600 milliGRAM(s) Oral every 6 hours  influenza   Vaccine 0.5 milliLiter(s) IntraMuscular once  ketorolac   Injectable 30 milliGRAM(s) IV Push once  oxytocin Infusion 167 milliUNIT(s)/Min (167 mL/Hr) IV Continuous <Continuous>  oxytocin Infusion 167 milliUNIT(s)/Min (167 mL/Hr) IV Continuous <Continuous>  prenatal multivitamin 1 Tablet(s) Oral daily  sodium chloride 0.9% lock flush 3 milliLiter(s) IV Push every 8 hours    MEDICATIONS  (PRN):  benzocaine 20%/menthol 0.5% Spray 1 Spray(s) Topical every 6 hours PRN for Perineal discomfort  dibucaine 1% Ointment 1 Application(s) Topical every 6 hours PRN Perineal discomfort  diphenhydrAMINE 25 milliGRAM(s) Oral every 6 hours PRN Pruritus  hydrocortisone 1% Cream 1 Application(s) Topical every 6 hours PRN Moderate Pain (4-6)  lanolin Ointment 1 Application(s) Topical every 6 hours PRN nipple soreness  magnesium hydroxide Suspension 30 milliLiter(s) Oral two times a day PRN Constipation  oxyCODONE    IR 5 milliGRAM(s) Oral every 3 hours PRN Moderate to Severe Pain (4-10)  oxyCODONE    IR 5 milliGRAM(s) Oral once PRN Moderate to Severe Pain (4-10)  pramoxine 1%/zinc 5% Cream 1 Application(s) Topical every 4 hours PRN Moderate Pain (4-6)  simethicone 80 milliGRAM(s) Chew every 4 hours PRN Gas  witch hazel Pads 1 Application(s) Topical every 4 hours PRN Perineal discomfort

## 2025-04-29 ENCOUNTER — INPATIENT (INPATIENT)
Facility: HOSPITAL | Age: 40
LOS: 1 days | Discharge: HOME | End: 2025-05-01
Attending: OBSTETRICS & GYNECOLOGY | Admitting: OBSTETRICS & GYNECOLOGY

## 2025-04-29 ENCOUNTER — APPOINTMENT (OUTPATIENT)
Dept: ANTEPARTUM | Facility: CLINIC | Age: 40
End: 2025-04-29

## 2025-04-29 VITALS
DIASTOLIC BLOOD PRESSURE: 88 MMHG | HEART RATE: 46 BPM | OXYGEN SATURATION: 100 % | TEMPERATURE: 98 F | RESPIRATION RATE: 16 BRPM | SYSTOLIC BLOOD PRESSURE: 140 MMHG

## 2025-04-29 DIAGNOSIS — K08.409 PARTIAL LOSS OF TEETH, UNSPECIFIED CAUSE, UNSPECIFIED CLASS: Chronic | ICD-10-CM

## 2025-04-29 DIAGNOSIS — O26.899 OTHER SPECIFIED PREGNANCY RELATED CONDITIONS, UNSPECIFIED TRIMESTER: ICD-10-CM

## 2025-04-29 LAB
ALBUMIN SERPL ELPH-MCNC: 3.6 G/DL — SIGNIFICANT CHANGE UP (ref 3.3–5)
ALP SERPL-CCNC: 157 U/L — HIGH (ref 40–120)
ALT FLD-CCNC: 35 U/L — HIGH (ref 4–33)
ANION GAP SERPL CALC-SCNC: 12 MMOL/L — SIGNIFICANT CHANGE UP (ref 7–14)
AST SERPL-CCNC: 33 U/L — HIGH (ref 4–32)
BASOPHILS # BLD AUTO: 0.03 K/UL — SIGNIFICANT CHANGE UP (ref 0–0.2)
BASOPHILS NFR BLD AUTO: 0.3 % — SIGNIFICANT CHANGE UP (ref 0–2)
BILIRUB SERPL-MCNC: 0.5 MG/DL — SIGNIFICANT CHANGE UP (ref 0.2–1.2)
BLD GP AB SCN SERPL QL: NEGATIVE — SIGNIFICANT CHANGE UP
BUN SERPL-MCNC: 15 MG/DL — SIGNIFICANT CHANGE UP (ref 7–23)
CALCIUM SERPL-MCNC: 9.1 MG/DL — SIGNIFICANT CHANGE UP (ref 8.4–10.5)
CHLORIDE SERPL-SCNC: 105 MMOL/L — SIGNIFICANT CHANGE UP (ref 98–107)
CO2 SERPL-SCNC: 21 MMOL/L — LOW (ref 22–31)
CREAT SERPL-MCNC: 0.78 MG/DL — SIGNIFICANT CHANGE UP (ref 0.5–1.3)
EGFR: 98 ML/MIN/1.73M2 — SIGNIFICANT CHANGE UP
EGFR: 98 ML/MIN/1.73M2 — SIGNIFICANT CHANGE UP
EOSINOPHIL # BLD AUTO: 0.16 K/UL — SIGNIFICANT CHANGE UP (ref 0–0.5)
EOSINOPHIL NFR BLD AUTO: 1.8 % — SIGNIFICANT CHANGE UP (ref 0–6)
GLUCOSE SERPL-MCNC: 71 MG/DL — SIGNIFICANT CHANGE UP (ref 70–99)
HCT VFR BLD CALC: 43.2 % — SIGNIFICANT CHANGE UP (ref 34.5–45)
HGB BLD-MCNC: 14.1 G/DL — SIGNIFICANT CHANGE UP (ref 11.5–15.5)
IANC: 5.67 K/UL — SIGNIFICANT CHANGE UP (ref 1.8–7.4)
IMM GRANULOCYTES NFR BLD AUTO: 0.5 % — SIGNIFICANT CHANGE UP (ref 0–0.9)
LDH SERPL L TO P-CCNC: 339 U/L — HIGH (ref 135–225)
LYMPHOCYTES # BLD AUTO: 2.2 K/UL — SIGNIFICANT CHANGE UP (ref 1–3.3)
LYMPHOCYTES # BLD AUTO: 24.8 % — SIGNIFICANT CHANGE UP (ref 13–44)
MCHC RBC-ENTMCNC: 30.1 PG — SIGNIFICANT CHANGE UP (ref 27–34)
MCHC RBC-ENTMCNC: 32.6 G/DL — SIGNIFICANT CHANGE UP (ref 32–36)
MCV RBC AUTO: 92.3 FL — SIGNIFICANT CHANGE UP (ref 80–100)
MONOCYTES # BLD AUTO: 0.76 K/UL — SIGNIFICANT CHANGE UP (ref 0–0.9)
MONOCYTES NFR BLD AUTO: 8.6 % — SIGNIFICANT CHANGE UP (ref 2–14)
NEUTROPHILS # BLD AUTO: 5.67 K/UL — SIGNIFICANT CHANGE UP (ref 1.8–7.4)
NEUTROPHILS NFR BLD AUTO: 64 % — SIGNIFICANT CHANGE UP (ref 43–77)
NRBC # BLD AUTO: 0 K/UL — SIGNIFICANT CHANGE UP (ref 0–0)
NRBC # FLD: 0 K/UL — SIGNIFICANT CHANGE UP (ref 0–0)
NRBC BLD AUTO-RTO: 0 /100 WBCS — SIGNIFICANT CHANGE UP (ref 0–0)
PLATELET # BLD AUTO: 237 K/UL — SIGNIFICANT CHANGE UP (ref 150–400)
POTASSIUM SERPL-MCNC: 4.7 MMOL/L — SIGNIFICANT CHANGE UP (ref 3.5–5.3)
POTASSIUM SERPL-SCNC: 4.7 MMOL/L — SIGNIFICANT CHANGE UP (ref 3.5–5.3)
PROT SERPL-MCNC: 6.7 G/DL — SIGNIFICANT CHANGE UP (ref 6–8.3)
RBC # BLD: 4.68 M/UL — SIGNIFICANT CHANGE UP (ref 3.8–5.2)
RBC # FLD: 13.9 % — SIGNIFICANT CHANGE UP (ref 10.3–14.5)
RH IG SCN BLD-IMP: POSITIVE — SIGNIFICANT CHANGE UP
SODIUM SERPL-SCNC: 138 MMOL/L — SIGNIFICANT CHANGE UP (ref 135–145)
URATE SERPL-MCNC: 6.2 MG/DL — SIGNIFICANT CHANGE UP (ref 2.5–7)
WBC # BLD: 8.86 K/UL — SIGNIFICANT CHANGE UP (ref 3.8–10.5)
WBC # FLD AUTO: 8.86 K/UL — SIGNIFICANT CHANGE UP (ref 3.8–10.5)

## 2025-04-29 RX ORDER — DIPHENHYDRAMINE HCL 12.5MG/5ML
25 ELIXIR ORAL ONCE
Refills: 0 | Status: COMPLETED | OUTPATIENT
Start: 2025-04-29 | End: 2025-04-29

## 2025-04-29 RX ORDER — BENZOCAINE 220 MG/G
1 SPRAY, METERED PERIODONTAL EVERY 6 HOURS
Refills: 0 | Status: DISCONTINUED | OUTPATIENT
Start: 2025-04-29 | End: 2025-05-01

## 2025-04-29 RX ORDER — SIMETHICONE 80 MG
80 TABLET,CHEWABLE ORAL EVERY 4 HOURS
Refills: 0 | Status: DISCONTINUED | OUTPATIENT
Start: 2025-04-29 | End: 2025-05-01

## 2025-04-29 RX ORDER — INFLUENZA A VIRUS A/IDAHO/07/2018 (H1N1) ANTIGEN (MDCK CELL DERIVED, PROPIOLACTONE INACTIVATED, INFLUENZA A VIRUS A/INDIANA/08/2018 (H3N2) ANTIGEN (MDCK CELL DERIVED, PROPIOLACTONE INACTIVATED), INFLUENZA B VIRUS B/SINGAPORE/INFTT-16-0610/2016 ANTIGEN (MDCK CELL DERIVED, PROPIOLACTONE INACTIVATED), INFLUENZA B VIRUS B/IOWA/06/2017 ANTIGEN (MDCK CELL DERIVED, PROPIOLACTONE INACTIVATED) 15; 15; 15; 15 UG/.5ML; UG/.5ML; UG/.5ML; UG/.5ML
0.5 INJECTION, SUSPENSION INTRAMUSCULAR ONCE
Refills: 0 | Status: DISCONTINUED | OUTPATIENT
Start: 2025-04-29 | End: 2025-05-01

## 2025-04-29 RX ORDER — MAGNESIUM SULFATE 500 MG/ML
2 SYRINGE (ML) INJECTION
Qty: 40 | Refills: 0 | Status: DISCONTINUED | OUTPATIENT
Start: 2025-04-29 | End: 2025-05-01

## 2025-04-29 RX ORDER — PRENATAL 136/IRON/FOLIC ACID 27 MG-1 MG
1 TABLET ORAL DAILY
Refills: 0 | Status: DISCONTINUED | OUTPATIENT
Start: 2025-04-29 | End: 2025-05-01

## 2025-04-29 RX ORDER — IBUPROFEN 200 MG
600 TABLET ORAL ONCE
Refills: 0 | Status: COMPLETED | OUTPATIENT
Start: 2025-04-29 | End: 2025-04-29

## 2025-04-29 RX ORDER — HYDROCORTISONE 10 MG/G
1 CREAM TOPICAL EVERY 6 HOURS
Refills: 0 | Status: DISCONTINUED | OUTPATIENT
Start: 2025-04-29 | End: 2025-05-01

## 2025-04-29 RX ORDER — MAGNESIUM SULFATE 500 MG/ML
4 SYRINGE (ML) INJECTION ONCE
Refills: 0 | Status: COMPLETED | OUTPATIENT
Start: 2025-04-29 | End: 2025-04-29

## 2025-04-29 RX ORDER — MAGNESIUM HYDROXIDE 400 MG/5ML
30 SUSPENSION ORAL
Refills: 0 | Status: DISCONTINUED | OUTPATIENT
Start: 2025-04-29 | End: 2025-05-01

## 2025-04-29 RX ORDER — METOCLOPRAMIDE HCL 10 MG
10 TABLET ORAL ONCE
Refills: 0 | Status: COMPLETED | OUTPATIENT
Start: 2025-04-29 | End: 2025-04-29

## 2025-04-29 RX ORDER — DIBUCAINE 10 MG/G
1 OINTMENT TOPICAL EVERY 6 HOURS
Refills: 0 | Status: DISCONTINUED | OUTPATIENT
Start: 2025-04-29 | End: 2025-05-01

## 2025-04-29 RX ORDER — MODIFIED LANOLIN 100 %
1 CREAM (GRAM) TOPICAL EVERY 6 HOURS
Refills: 0 | Status: DISCONTINUED | OUTPATIENT
Start: 2025-04-29 | End: 2025-05-01

## 2025-04-29 RX ORDER — SODIUM CHLORIDE 9 G/1000ML
1000 INJECTION, SOLUTION INTRAVENOUS
Refills: 0 | Status: DISCONTINUED | OUTPATIENT
Start: 2025-04-29 | End: 2025-05-01

## 2025-04-29 RX ORDER — NIFEDIPINE 30 MG
30 TABLET, EXTENDED RELEASE 24 HR ORAL DAILY
Refills: 0 | Status: DISCONTINUED | OUTPATIENT
Start: 2025-04-29 | End: 2025-05-01

## 2025-04-29 RX ORDER — PRAMOXINE HCL 1 %
1 GEL (GRAM) TOPICAL EVERY 4 HOURS
Refills: 0 | Status: DISCONTINUED | OUTPATIENT
Start: 2025-04-29 | End: 2025-05-01

## 2025-04-29 RX ORDER — WITCH HAZEL LEAF
1 FLUID EXTRACT MISCELLANEOUS EVERY 4 HOURS
Refills: 0 | Status: DISCONTINUED | OUTPATIENT
Start: 2025-04-29 | End: 2025-05-01

## 2025-04-29 RX ADMIN — Medication 300 GRAM(S): at 17:50

## 2025-04-29 RX ADMIN — Medication 30 MILLIGRAM(S): at 17:40

## 2025-04-29 RX ADMIN — Medication 50 GM/HR: at 20:29

## 2025-04-29 RX ADMIN — Medication 3 MILLILITER(S): at 21:01

## 2025-04-29 RX ADMIN — Medication 10 MILLIGRAM(S): at 17:22

## 2025-04-29 RX ADMIN — Medication 50 GM/HR: at 18:13

## 2025-04-29 RX ADMIN — Medication 50 GM/HR: at 19:08

## 2025-04-29 RX ADMIN — Medication 25 MILLIGRAM(S): at 17:21

## 2025-04-29 RX ADMIN — Medication 600 MILLIGRAM(S): at 17:56

## 2025-04-29 RX ADMIN — Medication 600 MILLIGRAM(S): at 17:22

## 2025-04-29 NOTE — OB POSTPARTUM TRIAGE NOTE - HISTORY OF PRESENT ILLNESS
Patient
38yo female  PPD7 sp  presents with elevated blood pressure at home 153/97, headache 2/10, blurry vision today and seeing white specks yesterday. Denies shortness of breath, fever, chills or cough, RUQ, epigastric pain. Pt states she took 500mg Tylenol @1130am for her headache which slightly helped.    Primary OB: [facility]

## 2025-04-29 NOTE — OB POSTPARTUM TRIAGE NOTE - NS_OBGYNHISTORY_OBGYN_ALL_OB_FT
24- , c/b shoulder dystocia, 9#4  NSVDx4    Gyn Hx: Denies fibroids, ovarian cysts, abnormal pap smears

## 2025-04-29 NOTE — OB POSTPARTUM TRIAGE NOTE - NSOBPROVIDERNOTE_OBGYN_ALL_OB_FT
38yo  PPD 7 sp  presenting with signs and symptoms of severe preeclampsia     - IV access, CBC/CMP/Uric acid/LDH  - Magnesium for seizure prophylaxis  - Start Procardia 30xl QD  - Admits to Postpartum     - Discussed with Dr. Earline Guajardo, PAC

## 2025-04-29 NOTE — PATIENT PROFILE ADULT - HOME ACCESSIBILITY CONCERNS
Subjective     REASON FOR follow-up:    1.  Heterozygous state for factor V Leiden    2.  New onset stroke on aspirin by neurology    3.  Hypercholesterolemia    4.  Hypercoagulable work-up done.  Antithrombin 109% protein S activity 85% protein S antigen 107%, protein C activity 85%.  Anticardiolipin antibody IgM 24%, antibeta-2 glycoprotein antibody negative, lupus anticoagulant not detected, prothrombin gene mutation negative.    5.  Screening mammogram showed abnormality in the left breast 6 o'clock position, 8 mm on ultrasound, ultrasound-guided left breast biopsy, 6 cm from the nipple showed evidence of invasive ductal carcinoma poorly differentiated grade 3, [place] score of 8 out of 9 ER negative, SD negative, HER-2/ese 3+ positive.    6.  CT scan February 24, 2021 showed focal area of fatty infiltration of the liver.  1 cm hypoattenuating cortical lesion in the upper pole of the right kidney.  Similarly nodule in the upper pole of the left kidney is 1.5 cm.  Further evaluation by ultrasound suggested  · Ultrasound March 1, 2021 shows solid lesion in the upper pole of the right kidney.  In the left kidney along the midpole of the left kidney is a nodule which is 16 x 16 x 14 mm which is thought to be a cyst.  A 6-month follow-up CT suggested    6.  S/p left partial mastectomy with sentinel lymph node biopsy.  Tumor was present at 5:00, invasive ductal carcinoma, [place] score of 8, grade 3, greatest dimension was 12 mm x 8 x 4 mm.  Single focus of invasive carcinoma present.  There was extensive DCIS which is 30 mm x 8 x 2 mm, grade 3, no evidence of lymphovascular space invasion or dermal lymphatic invasion.  Margins were clear.  4 lymph nodes were negative.  It is a p T1cp N0, ER negative SD negative HER-2/ese 3+ with Ki-67 of 50%.  · [facility] genetic test negative for 47 genes    7.recently initiated Xarelto at loading dose of 15 mg twice a day x3 weeks to then be switched to 20 mg daily per  protocol.  She is doing this because of Mediport in place and known factor V Leiden heterozygosity.        History of Present Illness   Patient is a 42 y.o. with the above-mentioned history is here today for lab review and follow-up due for her Khang.  At her last visit, Dr. Beavers started the patient on gabapentin to help with neuropathy and sleep.  The patient states that she only took a few doses and had issues with headaches.  She was also concerned about several of the other side effects, that she has discontinued the medication.  She has previously tried melatonin without much benefit.  Otherwise, she is doing okay.  She denies any issues with shortness of breath or swelling.  No significant issues with skin rash, diarrhea, nausea, or vomiting.      Oncologic history:  Patient is here for follow-up of her mammogram.  Patient had screening mammogram April 2, 2021:  NAD a focal asymmetry was present in the posterior one third retroareolar region of the left breast.  Further spot compression images and left breast ultrasound was suggested.  Right breast was negative    April 9, 2021: Left diagnostic mammogram showed an area of focal asymmetry in the posterior one third region aspect of the left breast her breast    Ultrasound showed an irregular 0.8 cm lesion in the left breast at the 6 o'clock position of the order of 6 cm from the nipple.  Ultrasound-guided left breast biopsy was recommended.    April 26, 2021: Ultrasound-guided biopsy of the left breast 6 o'clock position, 6 cm from the nipple showed    Invasive ductal carcinoma, poorly differentiated with a [place] score grade 3 with a score of 8 out of 9 measuring at least 7 mm.  No definitive ductal carcinoma is in situ is identified.  ER 1% negative  [place] 1% negative   HER-2/ese 3+ positive    There was no evidence of lymphadenopathy either in the mammogram of the ultrasound.  We suggested an MRI of the breast.  Patient has strong family history of  breast cancer      May 7, 2021: MRI of bilateral breast reviewed.  My interpretation is that there is area of enhancement in the 6 o'clock position of the left breast this is the biopsy-proven malignancy.  There is additional area of linear enhancement anteriorly and laterally measuring up to 1.2 cm this is approximately 5.6 cm from the nipple.  In addition there is a enhancement 2 to 3 mm in the anterolateral aspect of the lateral aspect of the left breast which is 4.6 cm from the nipple.  There is also mildly prominent posterior lymph node in the mid axilla which measures 1 cm with cortical thickening.  Findings are consistent with multifocal disease.  No contralateral disease or internal mammary adenopathy identified    May 20, 2021: Genetic test, [facility] genetic test shows 47 genes negative    May 21, 2021: I reviewed the biopsy of the lymph node in the left axilla which shows reactive lymph node negative for any carcinoma or lymphoma    June 2, 2021:Final Diagnosis  1. Left Breast, 5:00 o'clock, MRI-guided Biopsies for Linear Enhancement: INVASIVE MAMMARY CARCINOMA, NO  SPECIAL TYPE (INVASIVE DUCTAL CARCINOMA).  A. Largest contiguous focus in a core measures 4 mm.  B. No in-situ component identified.  C. Brisk lymphocytic response noted (see Comment).  D. No definitive lymphovascular nor perineural invasion identified.  2. Left Breast, 2:00 o'clock, MRI-guided Biopsy for Enhancement:  A. Benign breast parenchyma with radial scar and micropapillomas.  B. Usual ductal hyperplasia and columnar cell hyperplasia.  C. No atypical hyperplasia, in-situ nor invasive carcinoma identified    ER, [place], HER-2 new and Ki-67 pending on this new biopsy      Patient has been seen by Dr. Lashonda Euceda with plans of doing surgery on July first 2021    Once patient undergoes surgery lumpectomy with sentinel lymph node biopsy we will give further recommendation about treatment options.  Given that patient has multifocal disease  and both of the lesions 2 lesions are 1.2 cm each, with it being a HER-2 positive tumor on the previous biopsy at 6:00 Dr. Euceda and myself discussed and patient preferred to undergo port placement at the same time of surgery.    Patient had [facility] genetic test which was negative.    She has completed surgery July 1, 2021.  She underwent left partial mastectomy with left axillary sentinel lymph node biopsy and right port placement.  Clinically she was thought to have a high-grade multifocal invasive ductal carcinoma ER/WA negative, WA positive.  The lesions require preoperative localization.  The multifocal cancer was bracketed with 2 savvy markers and the radial scar was localized with a needle.  Because of the volume of tissue that will be excised related to the breast volume the case was done in conjunction with Dr. Zavala for oncoplastic closure.    She is 2 weeks from surgery.  She is healing up reasonably well.    On review of her pathology she had left partial mastectomy with sentinel lymph node biopsy.  Tumor was present at 5:00, invasive ductal carcinoma, [place] score of 8, grade 3, greatest dimension was 12 mm x 8 x 4 mm.  Single focus of invasive carcinoma present.  There was extensive DCIS which is 30 mm x 8 x 2 mm, grade 3, no evidence of lymphovascular space invasion or dermal lymphatic invasion.  Margins were clear.  4 lymph nodes were negative.  It is a p T1cp N0, ER negative WA negative HER-2/ese 3+ with Ki-67 of 50%.    Patient is here to discuss further options of treatment.  Given small tumor T1c N0, she is a good candidate for weekly Taxol Herceptin.    Given that patient has heterozygous state for factor V Leiden and currently has port placed we will plan to start Eliquis 2.5 mg p.o. twice daily.  I have left a message for Dr. Craft in neurology to call me to discuss if patient needs to continue aspirin or we can hold off since be starting Eliquis.      Genetic test negative    August  4, 2021: Weekly Taxol Herceptin x12 weeks followed by Herceptin x1 year.  Cycle 1 today    Hematologic history:  patient is a 41-year-old female who presented end of December with numbness and tingling in her left upper extremity and left face.  She went to see Dr. Goodman who then got concerned and referred to neurology.  She was referred to Dr. Freedman and talk to Dr. Donna guo for evaluation.  Patient underwent ultrasound of the carotids which did not show significant stenosis of the carotids.  She underwent 2D echocardiogram which showed a good ejection fraction of 64% with mild mitral valve prolapse and mild mitral stenosis.  She had a 24-hour Holter which was negative.  She then had also an MRI of the brain February 3, 2021 which showed areas of hyperintensity involving the subcortical white matter of the right frontal lobe superior laterally and posteriorly with the largest area measuring 8 9 mm.  There is also enhancement present.  The findings are consistent with a subacute infarct.  They could not differentiate if there is any underlying neoplastic process but a short-term follow-up was suggested in order to follow-up.  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    Patient currently got started on aspirin and factor V Leiden was obtained by neurology because patient's paternal aunt had factor V Leiden mutation and she was heterozygous.  Patient's testing also showed that she was heterozygous.    Patient states her numbness and tingling is resolved completely on the left arm and face it just lasted for a 24 hours.  She was here referred here for neurology to see if there is any other cause for her underlying hypercoagulable state.  She has not had a complete hypercoagulable work-up.  Also discussed with her that an underlying malignancy could cause a hypercoagulable state and we would need to do a CT  scan to rule that out.    Patient's mother has had breast cancer in her 50s but had pancreatic cancer at 68 and  from that.  Patient's dad had stroke at 63.  But he is alive at 74.  She has 1 brother who is 40 in good health.  Paternal aunt had breast cancer in her 40s.  Paternal grandfather had colon cancer in his late 80s.  Maternal uncle had throat cancer and another maternal uncle had skin cancer with metastasis to the lung.  And maternal grandmother had breast cancer.    Patient was to have mammogram done last year but because of COVID-19 it got postponed and she has not had it done yet.    Patient used to be a smoker half pack per day for 7 years but quit 10 years ago.  She has high cholesterol for which she is on treatment.    Past Medical History:   Diagnosis Date   • Acute stress reaction 2014   • ADD (attention deficit disorder)    • ADHD (attention deficit hyperactivity disorder)    • Anxiety and depression    • CTS (carpal tunnel syndrome)    • Factor 5 Leiden mutation, heterozygous ([facility]) 2021   • Family history of breast cancer 2013   • Fracture, cervical vertebra ([facility]) 1997    C4   • H/O complete eye exam 2016   • Hearing loss    • Hearing loss of both ears     HEARING AIDS IN BOTH EARS   • History of rheumatic fever    • Hypertension    • Hypothyroidism    • Infiltrating ductal carcinoma of left breast ([facility]) 2021    Left   • Kidney stone    • Mitral valve insufficiency    • PONV (postoperative nausea and vomiting)    • Stroke ([facility]) 2020    HX OF   • Stroke-like symptoms         Past Surgical History:   Procedure Laterality Date   • BREAST BIOPSY Left 2021    IDC   • BREAST LUMPECTOMY WITH SENTINEL NODE BIOPSY N/A 2021    Procedure: right port placement, Left SHAINA-guided, bracketed, partial mastectomy and sentinel lymph node biopsy Left breast needle-localized excisional biopsy;  Surgeon: Lashonda Euceda MD;  Location: [facility] OR;  Service: General;   Laterality: N/A;   • BREAST SURGERY Bilateral 7/1/2021    Procedure: RIGHT BREAST REDUCTION MASTOPEXY LEFT BREAST ONCOPLASTIC CLOSURE;  Surgeon: Caridad Baker MD PhD;  Location: [facility] OR;  Service: Plastics;  Laterality: Bilateral;   • NO PAST SURGERIES     • PAP SMEAR  2016        Current Outpatient Medications on File Prior to Visit   Medication Sig Dispense Refill   • amphetamine-dextroamphetamine XR (Adderall XR) 25 MG 24 hr capsule Take 1 capsule by mouth Every Morning 30 capsule 0   • atorvastatin (Lipitor) 10 MG tablet Take 1 tablet by mouth Daily. 30 tablet 11   • famotidine (PEPCID) 20 MG tablet TAKE 1 TABLET BY MOUTH DAILY 30 tablet 3   • levothyroxine (Synthroid) 125 MCG tablet Take 1 tablet by mouth Daily. 90 tablet 1   • lidocaine-prilocaine (EMLA) 2.5-2.5 % cream Apply  topically to the appropriate area as directed As Needed for Mild Pain . 1 each 2   • LORazepam (ATIVAN) 0.5 MG tablet Take 1 tablet by mouth Every 8 (Eight) Hours As Needed for Anxiety. for anxiety 30 tablet 2   • ondansetron ODT (ZOFRAN-ODT) 8 MG disintegrating tablet Place 1 tablet on the tongue Every 8 (Eight) Hours As Needed for Nausea or Vomiting. 30 tablet 3   • valsartan (DIOVAN) 160 MG tablet Take 1 tablet by mouth Daily. 30 tablet 5   • Xarelto 20 MG tablet TAKE 1 TABLET BY MOUTH DAILY 30 tablet 3   • [DISCONTINUED] gabapentin (NEURONTIN) 100 MG capsule Take 1 capsule by mouth every night at bedtime. 30 capsule 2     Current Facility-Administered Medications on File Prior to Visit   Medication Dose Route Frequency Provider Last Rate Last Admin   • heparin injection 500 Units  500 Units Intravenous PRN Neena Beavers MD   500 Units at 08/11/21 1718   • sodium chloride 0.9 % flush 10 mL  10 mL Intravenous PRN Neena Beavers MD   10 mL at 08/11/21 1718        ALLERGIES:    Allergies   Allergen Reactions   • Sulfa Antibiotics Rash        Social History     Socioeconomic History   • Marital status: Significant  "Other   • Number of children: 0   Tobacco Use   • Smoking status: Former Smoker     Packs/day: 1.00     Years: 10.00     Pack years: 10.00     Types: Cigarettes     Start date:      Quit date:      Years since quittin.2   • Smokeless tobacco: Never Used   Vaping Use   • Vaping Use: Never used   Substance and Sexual Activity   • Alcohol use: Yes     Comment: RARELY   • Drug use: No   • Sexual activity: Defer        Family History   Problem Relation Age of Onset   • Breast cancer Mother 53   • Pancreatic cancer Mother 68   • Lung cancer Maternal Grandmother    • Stroke Father    • Breast cancer Paternal Aunt 55   • Prostate cancer Maternal Grandfather    • Prostate cancer Paternal Grandfather    • Brain cancer Maternal Cousin 20   • Melanoma Maternal Uncle    • Ovarian cancer Other         Paternal great aunt    • Breast cancer Other    • Breast cancer Paternal Great-Grandmother 94   • Hypertension Brother    • Malig Hyperthermia Neg Hx       Family  history: Mother had breast cancer at age 57.  Maternal great aunt had breast cancer and paternal great aunt had breast cancer in her 40s.  Patient's mother had pancreatic cancer as well.  Paternal grandfather had prostate cancer.    Review of Systems as per HPI       Objective     Vitals:    22 0819   BP: 123/84   Pulse: 90   Resp: 16   Temp: 97.1 °F (36.2 °C)   TempSrc: Temporal   SpO2: 100%   Weight: 67.9 kg (149 lb 12.8 oz)   Height: 157 cm (61.81\")   PainSc: 0-No pain     Current Status 3/16/2022   ECOG score 0     Physical Exam  Vitals reviewed.   Constitutional:       General: She is not in acute distress.     Appearance: Normal appearance. She is well-developed.   HENT:      Head: Normocephalic and atraumatic.   Eyes:      Pupils: Pupils are equal, round, and reactive to light.   Cardiovascular:      Rate and Rhythm: Normal rate and regular rhythm.      Heart sounds: Normal heart sounds. No murmur heard.  Pulmonary:      Effort: Pulmonary effort " is normal. No respiratory distress.      Breath sounds: Normal breath sounds. No wheezing, rhonchi or rales.   Abdominal:      General: Bowel sounds are normal. There is no distension.      Palpations: Abdomen is soft.   Musculoskeletal:         General: Normal range of motion.      Cervical back: Normal range of motion.   Skin:     General: Skin is warm and dry.      Findings: No rash.   Neurological:      Mental Status: She is alert and oriented to person, place, and time.         RECENT LABS:  Results from last 7 days   Lab Units 03/16/22  0816   WBC 10*3/mm3 6.00   NEUTROS ABS 10*3/mm3 3.59   HEMOGLOBIN g/dL 12.9   HEMATOCRIT % 39.3   PLATELETS 10*3/mm3 250             Assessment/Plan       * [id number], ER negative VT negative HER-2/ese 3+ with Ki-67 of 50%.  S/p left partial mastectomy with sentinel lymph node biopsy.  Tumor was present at 5:00, invasive ductal carcinoma, [place] score of 8, grade 3, greatest dimension was 12 mm x 8 x 4 mm.  Single focus of invasive carcinoma present.  There was extensive DCIS which is 30 mm x 8 x 2 mm, grade 3, no evidence of lymphovascular space invasion or dermal lymphatic invasion.  Margins were clear.  4 lymph nodes were negative.  It is a p T1cp N0, ER negative VT negative HER-2/ese 3+ with Ki-67 of 50%.  · Patient is s/p port placement  · Discussed in length with patient that given a small tumor she is eligible for weekly Taxol Herceptin.  Weekly Taxol x12 weeks along with weekly Herceptin followed by 1 year of Herceptin.  · Discussed patient in the breast cancer conference  · 2D echo done which showed ejection fraction of 61-65% and strain pattern of -20.8.  · Patient also seen by Dr. Virk cardiology and Dr. Virk is planning to repeat echocardiogram in 3 months after initiation of therapy.  · 8/4/2021 initiating weekly Taxol Herceptin  · 9/8/2021, proceed with week #6 Taxol and Herceptin.  Patient continues to tolerate treatment well. No signs of peripheral  neuropathy.  · Her next echocardiogram due November 4, 2021, She follows up with cardiology Dr. Camron Virk.  · 9/28/2021, proceed with week #9 taxol/herceptin.  She does feel her nausea has worsened after her last cycle of chemotherapy.  She prefers the disintegrating Zofran, this will be called to her pharmacy today.  · Patient is here to take cycle 11 of weekly Taxol with worsening fatigue and worsening rash and overall dysphoric mood.  She also cannot sleep and her quality life is worsening.  At the present time we will plan to give 1 more cycle of Taxol following which she will be referred to radiation.    · Completed radiation November 2021.  · February 23, 2022: Reviewed echocardiogram with normal ejection fraction and strain pattern.  Patient seen by Dr. Virk and laquita to continue Herceptin  · 3/16/2022 here for continued Herceptin maintenance.    *  Factor V Leiden heterozygosity with right frontal stroke and patient presented in December 2020 with left-sided weakness tingling and numbness and also numbness in the face.  · Was referred to neurology and cardiology by Dr. Goodman  · Ultrasound of carotid does not show significant stenosis  · 24 Holter is negative  · 2D echocardiogram shows ejection fraction of 64% with mild mitral regurg and mitral stenosis  · Neurology obtain factor V Leiden given that patient's paternal aunt had's history of factor V Leiden and that was heterozygous for factor V Leiden  · Continue aspirin as per neurology.  Also patient on medications for her high cholesterol started after stroke currently asymptomatic  · Hypercoagulable work-up done which is negative except heterozygous state for factor V Leiden.  · Complete stroke work-up has been negative and since this is arterial stroke and she was not on aspirin prior to that and her symptoms have resolved I agree with continuing aspirin alone along with high cholesterol medications.  · MR venogram done on May 10, 2021 is negative.   Patient has been referred to the stroke neurologist Dr. Johnson  · Patient diagnosed with breast cancer with Mediport placed.  Per discussion with neurology, patient initiated on prophylactic Xarelto and aspirin discontinued.  · Patient had one nosebleed which lasted 10 minutes but with pinching the nose it helped.  But she is switching to 20 mg daily from tomorrow.  We discussed about placing ice and notifying us if her nosebleeds are significant.  But it resolved.  It was likely secondary to dry air  · 2021, patient continues to experience nosebleeds.  Reports these occur when her nose itches, and after scratching her nose it begins to bleed.  She feels this may be related to dry air, so I have asked her to start using saline nasal spray to help moisten her nose.  If she experiences nosebleeds that do not stop, I asked her to notify our office.  · Tolerating anticoagulation with Xarelto.  No bleeding issues with Xarelto  · Patient still has the port and hence will need to continue Xarelto    * Family history of cancer: Patient's mother had breast cancer at age 50 and pancreatic cancer at age 68 and  from pancreatic cancer.  Patient's maternal grandmother had breast cancer in her 50s.  Her maternal uncle had skin cancer which went to the lung and another maternal uncle had throat cancer.  Maternal aunt had call colon polyps.  Patient's paternal aunt had breast cancer in her 40s.  And paternal grandfather with colon cancer in his 80s.  Paternal aunt also had factor V Leiden.  · Genetic testing is negative    * Solid nodule in the right kidney on ultrasound, will schedule follow-up with urology  · Patient was to follow-up with Dr. Shultz  · Will need records from urology  · Will discuss with patient at her next appointment about follow-up with urology  · Patient was seen by Dr. Becerra and apparently no follow-up was needed for the nodules in the kidney.  · We will get records from Dr. Becerra's office    *   "Elevated BMI, encourage diet and exercise.  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  Patient has been requiring frequent Tums.  Recommended she begin Pepcid 20 mg daily.    · Stable, continue Pepcid 20 mg daily.    *Constipation likely secondary to ferrous sulfate.  Oral iron discontinued.     *Headache likely related to body ache because of Taxol  · 9/29/2021, patient discusses concern about worsening headaches.  Describing them as \"glass breaking\".  Started about 2 weeks ago, and progressively worsened.  Occurring 3-4 times daily now, and lasting less than 1 minute with each episode.  This occurs in the same place, right upper skull.  Denies vision changes or dizziness.  Will order stat MRI of the brain for further evaluation.  The patient follows with a neurologist, and is going to notify patient help with neuropathy as well of her symptoms and the plan for MRI.    *Elevated liver function tests, total bilirubin still normal AST ALT slightly elevated.  Patient did take Tylenol but not too much.  No dose adjustments required at the fingers time.  · Liver function tests normalized    *Iron deficiency anemia, patient's percent saturation still low at 9% s/p full dose of IV Venofer.  · 9/29/2021, patient will receive dose #5 Venofer today.  Hemoglobin today 10.1.  · 12/22/2021, hemoglobin today 12.7.  · 3/16/2022 hemoglobin remains normal at 12.9.    *Insomnia  · 9/29/2021, patient reports difficulty sleeping.  She is experiencing this every night, not just the nights of treatment when she receives IV dexamethasone.  She has attempted taking Benadryl, however felt groggy following this.  She is going to attempt melatonin to see if this improves her sleep.  · Worsening, will try gabapentin which will help with neuropathy as well  · Patient took gabapentin for a few nights and had issues with  headache.  She is also very concerned about other " possible side effects that she discontinued the medication.  She is tried melatonin in the past without results.  We discussed trying Benadryl versus other prescription medication.  She is going to try Benadryl first.    *Neuropathy still present in the fingers    Plan:   · Herceptin today.  · Return in 3 weeks for next dose of Herceptin.  · Return in 6 weeks for follow-up with Dr. Beavers with repeat labs, and consideration of Herceptin.  · Call/return sooner should she develop any new concerns or problems.        Patient on high risk medication requiring close monitor for toxicity.    Yumiko Dunne, APRN  03/16/22       Dr. Maxine Haynes      none

## 2025-04-29 NOTE — OB POSTPARTUM TRIAGE NOTE - CHIEF COMPLAINT QUOTE
Assist with meals PRN 
Elevated blood pressure at home 153/97, headache 2/10, blurry vision and seeing white specks

## 2025-04-29 NOTE — OB POSTPARTUM TRIAGE NOTE - NSHPPHYSICALEXAM_GEN_ALL_CORE
Vitals: ICU Vital Signs Last 24 Hrs  T(C): 36.8 (29 Apr 2025 16:12), Max: 36.8 (29 Apr 2025 16:12)  T(F): 98.2 (29 Apr 2025 16:12), Max: 98.2 (29 Apr 2025 16:12)  HR: 58 (29 Apr 2025 17:00) (46 - 58)  BP: 132/84 (29 Apr 2025 17:00) (98/84 - 153/86)  BP(mean): 98 (29 Apr 2025 17:00) (90 - 107)  ABP: --  ABP(mean): --  RR: 16 (29 Apr 2025 16:12) (16 - 16)  SpO2: 100% (29 Apr 2025 17:00) (100% - 100%)    General: A&O x3  Neuro: symmetrical facial features, no slurring of words  Lungs: breathing is unlabored  Extremities: full range of motion x4  Neuro: grossly intact

## 2025-04-29 NOTE — PATIENT PROFILE ADULT - TOBACCO USE
D: admitted for chest pain, shortness of breath, and was found to be in A. Fib with RVR.  PMH of IV AL amyloid diagnosed in 2016, restrictive cardiomyopathy/HFpEF, pAFib/Flutter, pulmonary emphysema, and CKD       I: Monitored vitals and assessed pt status. MD notified of rhythm change and c/o pain unrelieved by tylenol.  Changed: Converted to Afib/Flutter -12 lead EKG done  Running: PIV SL'd  PRN: Oxycodone 5mg x1    A: A0x4. VSS, on RA. Upon initial encounter pt was in the bathroom crying, c/o 8/10 HA and 4/10 chest pressure. Once back in bed pt converted into Aflutter 160s briefly then into Afib. MD notified and EKG ordered. Pt stated pain decreased to 2/10 after oxycodone. Good UO. On 2L FR    I/O this shift:  In: 440 [P.O.:440]  Out: 1000 [Urine:1000]    Temp:  [97.8  F (36.6  C)-98.6  F (37  C)] 98.3  F (36.8  C)  Pulse:  [] 127  Resp:  [16-20] 20  BP: (110-146)/(50-63) 146/63  SpO2:  [97 %-100 %] 98 %      P: Continue to monitor Pt status and report changes to treatment team.                                         Never smoker

## 2025-04-30 DIAGNOSIS — O14.90 UNSPECIFIED PRE-ECLAMPSIA, UNSPECIFIED TRIMESTER: ICD-10-CM

## 2025-04-30 LAB
ALBUMIN SERPL ELPH-MCNC: 3.5 G/DL — SIGNIFICANT CHANGE UP (ref 3.3–5)
ALP SERPL-CCNC: 151 U/L — HIGH (ref 40–120)
ALT FLD-CCNC: 30 U/L — SIGNIFICANT CHANGE UP (ref 4–33)
ANION GAP SERPL CALC-SCNC: 13 MMOL/L — SIGNIFICANT CHANGE UP (ref 7–14)
AST SERPL-CCNC: 24 U/L — SIGNIFICANT CHANGE UP (ref 4–32)
BASOPHILS # BLD AUTO: 0.03 K/UL — SIGNIFICANT CHANGE UP (ref 0–0.2)
BASOPHILS NFR BLD AUTO: 0.4 % — SIGNIFICANT CHANGE UP (ref 0–2)
BILIRUB SERPL-MCNC: 0.4 MG/DL — SIGNIFICANT CHANGE UP (ref 0.2–1.2)
BUN SERPL-MCNC: 12 MG/DL — SIGNIFICANT CHANGE UP (ref 7–23)
CALCIUM SERPL-MCNC: 7.6 MG/DL — LOW (ref 8.4–10.5)
CHLORIDE SERPL-SCNC: 101 MMOL/L — SIGNIFICANT CHANGE UP (ref 98–107)
CO2 SERPL-SCNC: 23 MMOL/L — SIGNIFICANT CHANGE UP (ref 22–31)
CREAT SERPL-MCNC: 0.77 MG/DL — SIGNIFICANT CHANGE UP (ref 0.5–1.3)
EGFR: 100 ML/MIN/1.73M2 — SIGNIFICANT CHANGE UP
EGFR: 100 ML/MIN/1.73M2 — SIGNIFICANT CHANGE UP
EOSINOPHIL # BLD AUTO: 0.08 K/UL — SIGNIFICANT CHANGE UP (ref 0–0.5)
EOSINOPHIL NFR BLD AUTO: 1 % — SIGNIFICANT CHANGE UP (ref 0–6)
GLUCOSE SERPL-MCNC: 90 MG/DL — SIGNIFICANT CHANGE UP (ref 70–99)
HCT VFR BLD CALC: 45.9 % — HIGH (ref 34.5–45)
HGB BLD-MCNC: 15.2 G/DL — SIGNIFICANT CHANGE UP (ref 11.5–15.5)
IANC: 6.06 K/UL — SIGNIFICANT CHANGE UP (ref 1.8–7.4)
IMM GRANULOCYTES NFR BLD AUTO: 0.4 % — SIGNIFICANT CHANGE UP (ref 0–0.9)
LDH SERPL L TO P-CCNC: 270 U/L — HIGH (ref 135–225)
LYMPHOCYTES # BLD AUTO: 1.54 K/UL — SIGNIFICANT CHANGE UP (ref 1–3.3)
LYMPHOCYTES # BLD AUTO: 18.4 % — SIGNIFICANT CHANGE UP (ref 13–44)
MAGNESIUM SERPL-MCNC: 5.6 MG/DL — HIGH (ref 1.6–2.6)
MAGNESIUM SERPL-MCNC: 5.7 MG/DL — HIGH (ref 1.6–2.6)
MAGNESIUM SERPL-MCNC: 6.9 MG/DL — HIGH (ref 1.6–2.6)
MCHC RBC-ENTMCNC: 30.2 PG — SIGNIFICANT CHANGE UP (ref 27–34)
MCHC RBC-ENTMCNC: 33.1 G/DL — SIGNIFICANT CHANGE UP (ref 32–36)
MCV RBC AUTO: 91.1 FL — SIGNIFICANT CHANGE UP (ref 80–100)
MONOCYTES # BLD AUTO: 0.63 K/UL — SIGNIFICANT CHANGE UP (ref 0–0.9)
MONOCYTES NFR BLD AUTO: 7.5 % — SIGNIFICANT CHANGE UP (ref 2–14)
NEUTROPHILS # BLD AUTO: 6.06 K/UL — SIGNIFICANT CHANGE UP (ref 1.8–7.4)
NEUTROPHILS NFR BLD AUTO: 72.3 % — SIGNIFICANT CHANGE UP (ref 43–77)
NRBC # BLD AUTO: 0 K/UL — SIGNIFICANT CHANGE UP (ref 0–0)
NRBC # FLD: 0 K/UL — SIGNIFICANT CHANGE UP (ref 0–0)
NRBC BLD AUTO-RTO: 0 /100 WBCS — SIGNIFICANT CHANGE UP (ref 0–0)
PLATELET # BLD AUTO: 238 K/UL — SIGNIFICANT CHANGE UP (ref 150–400)
POTASSIUM SERPL-MCNC: 3.9 MMOL/L — SIGNIFICANT CHANGE UP (ref 3.5–5.3)
POTASSIUM SERPL-SCNC: 3.9 MMOL/L — SIGNIFICANT CHANGE UP (ref 3.5–5.3)
PROT SERPL-MCNC: 6.4 G/DL — SIGNIFICANT CHANGE UP (ref 6–8.3)
RBC # BLD: 5.04 M/UL — SIGNIFICANT CHANGE UP (ref 3.8–5.2)
RBC # FLD: 13.7 % — SIGNIFICANT CHANGE UP (ref 10.3–14.5)
SODIUM SERPL-SCNC: 137 MMOL/L — SIGNIFICANT CHANGE UP (ref 135–145)
URATE SERPL-MCNC: 5.7 MG/DL — SIGNIFICANT CHANGE UP (ref 2.5–7)
WBC # BLD: 8.37 K/UL — SIGNIFICANT CHANGE UP (ref 3.8–10.5)
WBC # FLD AUTO: 8.37 K/UL — SIGNIFICANT CHANGE UP (ref 3.8–10.5)

## 2025-04-30 RX ORDER — IBUPROFEN 200 MG
600 TABLET ORAL EVERY 6 HOURS
Refills: 0 | Status: DISCONTINUED | OUTPATIENT
Start: 2025-04-30 | End: 2025-05-01

## 2025-04-30 RX ORDER — ACETAMINOPHEN 500 MG/5ML
975 LIQUID (ML) ORAL
Refills: 0 | Status: DISCONTINUED | OUTPATIENT
Start: 2025-04-30 | End: 2025-05-01

## 2025-04-30 RX ORDER — ACETAMINOPHEN 500 MG/5ML
1000 LIQUID (ML) ORAL ONCE
Refills: 0 | Status: DISCONTINUED | OUTPATIENT
Start: 2025-04-30 | End: 2025-05-01

## 2025-04-30 RX ORDER — HEPARIN SODIUM 1000 [USP'U]/ML
5000 INJECTION INTRAVENOUS; SUBCUTANEOUS EVERY 12 HOURS
Refills: 0 | Status: DISCONTINUED | OUTPATIENT
Start: 2025-04-30 | End: 2025-05-01

## 2025-04-30 RX ADMIN — Medication 975 MILLIGRAM(S): at 02:33

## 2025-04-30 RX ADMIN — SODIUM CHLORIDE 50 MILLILITER(S): 9 INJECTION, SOLUTION INTRAVENOUS at 12:36

## 2025-04-30 RX ADMIN — Medication 3 MILLILITER(S): at 05:33

## 2025-04-30 RX ADMIN — Medication 50 GM/HR: at 12:30

## 2025-04-30 RX ADMIN — Medication 975 MILLIGRAM(S): at 03:30

## 2025-04-30 RX ADMIN — Medication 1 TABLET(S): at 12:33

## 2025-04-30 RX ADMIN — Medication 30 MILLIGRAM(S): at 17:56

## 2025-04-30 NOTE — H&P ADULT - ASSESSMENT
38yo  PPD 7 sp  presenting with signs and symptoms of severe preeclampsia     - IV access, CBC/CMP/Uric acid/LDH  - Magnesium for seizure prophylaxis  - Start Procardia 30xl QD  - Admits to Postpartum     - Discussed with Dr. Earline BRODERICK written from triage note by ZOEY Ospina, MD Levon entered due to no documentation in chart.

## 2025-04-30 NOTE — PROGRESS NOTE ADULT - SUBJECTIVE AND OBJECTIVE BOX
S: Pt seen and examined at bedside. Patient feels well. She has no pain. Denies Nausea/vomiting/CP/SOB/lightheadedness/dizziness/headache/epigastric pain/changes in vision.    O:  Vitals:  Vital Signs Last 24 Hrs  T(C): 36.4 (30 Apr 2025 06:53), Max: 36.8 (29 Apr 2025 16:12)  T(F): 97.5 (30 Apr 2025 06:53), Max: 98.2 (29 Apr 2025 16:12)  HR: 70 (30 Apr 2025 06:53) (46 - 74)  BP: 114/76 (30 Apr 2025 06:53) (98/84 - 156/89)  BP(mean): 105 (29 Apr 2025 20:00) (90 - 111)  RR: 16 (30 Apr 2025 06:53) (16 - 17)  SpO2: 100% (30 Apr 2025 06:53) (98% - 100%)    Parameters below as of 29 Apr 2025 22:45  Patient On (Oxygen Delivery Method): room air    Physical Exam:  General: NAD  Abdomen: Soft, non-tender, non-distended  Vaginal: Lochia wnl  Extremities: No erythema/edema. Pt with varicose veins on R lateral thigh which she says are stable from baseline      MEDICATIONS  (STANDING):  acetaminophen     Tablet .. 975 milliGRAM(s) Oral <User Schedule>  acetaminophen   IVPB .. 1000 milliGRAM(s) IV Intermittent once  heparin   Injectable 5000 Unit(s) SubCutaneous every 12 hours  ibuprofen  Tablet. 600 milliGRAM(s) Oral every 6 hours  influenza   Vaccine 0.5 milliLiter(s) IntraMuscular once  lactated ringers. 1000 milliLiter(s) (50 mL/Hr) IV Continuous <Continuous>  magnesium sulfate Infusion 2 Gm/Hr (50 mL/Hr) IV Continuous <Continuous>  NIFEdipine XL 30 milliGRAM(s) Oral daily  prenatal multivitamin 1 Tablet(s) Oral daily  sodium chloride 0.9% lock flush 3 milliLiter(s) IV Push every 8 hours      Labs:  Blood type: A Positive  Rubella IgG: RPR: Negative                          15.2   8.37 >-----------< 238    ( 04-30 @ 06:00 )             45.9[H]                        14.1   8.86 >-----------< 237    ( 04-29 @ 16:56 )             43.2    04-30-25 @ 06:00      137  |  101  |  12  ----------------------------<  90  3.9   |  23  |  0.77    04-29-25 @ 16:30      138  |  105  |  15  ----------------------------<  71  4.7   |  21[L]  |  0.78        Ca    7.6[L]      30 Apr 2025 06:00  Ca    9.1      29 Apr 2025 16:30  Mg     5.60[H]     04-30    TPro  6.4  /  Alb  3.5  /  TBili  0.4  /  DBili  x   /  AST  24  /  ALT  30  /  AlkPhos  151[H]  04-30-25 @ 06:00  TPro  6.7  /  Alb  3.6  /  TBili  0.5  /  DBili  x   /  AST  33[H]  /  ALT  35[H]  /  AlkPhos  157[H]  04-29-25 @ 16:30

## 2025-04-30 NOTE — DISCHARGE NOTE PROVIDER - CARE PROVIDERS DIRECT ADDRESSES
[email or web address]

## 2025-04-30 NOTE — DISCHARGE NOTE PROVIDER - NSDCFUADDINST_GEN_ALL_CORE_FT
Check your blood pressure three times daily. I have sent a blood pressure cuff to [facility] here at [facility]; however, keep in mind that your insurance may not cover it. If so, please obtain a BP cuff through another option like [employer] or [employer].  Call the office for any blood pressure above 140/90. Go to the hospital for any blood pressures above 150/100. Follow up with your obstetrician in 2-3 days for a BP check. Take your BP medication as prescribed. If your blood pressure is less than 110/60 do not take your blood pressure medication. Watch out for a call from our cardiology obstetrics team to schedule a follow up appointment.  If you don't hear from them, please call them at [phone]. Please return to the hospital immediately if you have headaches that don't go away with medications, visual disturbances, chest pain, shortness of breath, or upper abdominal pain that does not go away.

## 2025-04-30 NOTE — DISCHARGE NOTE PROVIDER - NSDCMRMEDTOKEN_GEN_ALL_CORE_FT
acetaminophen 325 mg oral tablet: 3 tab(s) orally every 6 hours as needed for  moderate pain  dibucaine 1% topical ointment: 1 Apply topically to affected area every 6 hours As needed Perineal discomfort  ibuprofen 600 mg oral tablet: 1 tab(s) orally every 6 hours as needed for  moderate pain  witch hazel 50% topical pad: 1 Apply topically to affected area every 4 hours As needed Perineal discomfort   NIFEdipine 30 mg oral tablet, extended release: 1 tab(s) orally once a day

## 2025-04-30 NOTE — H&P ADULT - NSHPPHYSICALEXAM_GEN_ALL_CORE
Physical Exam:  · Physical Exam  Vitals: ICU Vital Signs Last 24 Hrs  T(C): 36.8 (29 Apr 2025 16:12), Max: 36.8 (29 Apr 2025 16:12)  T(F): 98.2 (29 Apr 2025 16:12), Max: 98.2 (29 Apr 2025 16:12)  HR: 58 (29 Apr 2025 17:00) (46 - 58)  BP: 132/84 (29 Apr 2025 17:00) (98/84 - 153/86)  BP(mean): 98 (29 Apr 2025 17:00) (90 - 107)  ABP: --  ABP(mean): --  RR: 16 (29 Apr 2025 16:12) (16 - 16)  SpO2: 100% (29 Apr 2025 17:00) (100% - 100%)    General: A&O x3  Neuro: symmetrical facial features, no slurring of words  Lungs: breathing is unlabored  Extremities: full range of motion x4  Neuro: grossly intact  · Other Physical Exam  All other review of systems negative, except as noted in HPI

## 2025-04-30 NOTE — H&P ADULT - HISTORY OF PRESENT ILLNESS
Elevated blood pressure at home 153/97, headache 2/10, blurry vision and seeing white specks  38yo female  PPD7 sp  presents with elevated blood pressure at home 153/97, headache 2/10, blurry vision today and seeing white specks yesterday. Denies shortness of breath, fever, chills or cough, RUQ, epigastric pain. Pt states she took 500mg Tylenol @1130am for her headache which slightly helped.    Primary OB: [facility]    Allergies:        Allergies:  · 	No Known Allergies:     Home Medications:   * Patient Currently Takes Medications as of 2025 09:04 documented in Structured Notes  · 	witch hazel 50% topical pad: 1 Apply topically to affected area every 4 hours As needed Perineal discomfort  · 	dibucaine 1% topical ointment: 1 Apply topically to affected area every 6 hours As needed Perineal discomfort  · 	acetaminophen 325 mg oral tablet: 3 tab(s) orally every 6 hours as needed for  moderate pain  · 	ibuprofen 600 mg oral tablet: 1 tab(s) orally every 6 hours as needed for  moderate pain    History:   OB/GYN History:  · OB/GYN History      25- , c/b shoulder dystocia, 9#4  NSVDx4    Gyn Hx: Denies fibroids, ovarian cysts, abnormal pap smears

## 2025-04-30 NOTE — PROGRESS NOTE ADULT - ASSESSMENT
A/P: 40yo PPD #8 s/p  admitted with PP sPEC.  Patient is stable without symptoms at this time.    #PP sPEC  - Pt met criteria due to headache and vision changes which hasve both resolved  - Continue Mg  - Continue Procardia 30 XL daily  - AM HELLP labs wnl    #Postpartum recovery from vaginal delivery  - Pain well controlled, continue current pain regimen. Standing Ibuprofen, Acetaminophen. Oxycodone available PRN for breakthrough pain.  - Increase ambulation, SCDs when not ambulating  - Continue regular diet    Tiara Pedraza PGY3

## 2025-04-30 NOTE — DISCHARGE NOTE PROVIDER - CARE PROVIDER_API CALL
Fallon Patten  Obstetrics and Gynecology  [address] [place] [id number]  Phone: [phone]  Follow Up Time: 1-3 days

## 2025-04-30 NOTE — DISCHARGE NOTE PROVIDER - NSDCCPCAREPLAN_GEN_ALL_CORE_FT
PRINCIPAL DISCHARGE DIAGNOSIS  Diagnosis: Severe preeclampsia  Assessment and Plan of Treatment: - S/p Mg (4/29-4/30)   - Currently on Procardia 30XL daily  - Follow up with OB in 2-3 days for BP check  - Follow up with Cards OB  - 3x daily BP checks w return precautions

## 2025-04-30 NOTE — DISCHARGE NOTE PROVIDER - HOSPITAL COURSE
38yo PPD #8 s/p  admitted with PP sPEC (HA, vision changes). Mg (-). Currently on Procardia 30XL daily for BP control. Follow up with OB in 2-3 days for BP check. Follow up with Cards OB.

## 2025-05-01 ENCOUNTER — TRANSCRIPTION ENCOUNTER (OUTPATIENT)
Age: 40
End: 2025-05-01

## 2025-05-01 VITALS
DIASTOLIC BLOOD PRESSURE: 71 MMHG | OXYGEN SATURATION: 100 % | RESPIRATION RATE: 12 BRPM | HEART RATE: 64 BPM | SYSTOLIC BLOOD PRESSURE: 113 MMHG

## 2025-05-01 RX ORDER — NIFEDIPINE 30 MG
1 TABLET, EXTENDED RELEASE 24 HR ORAL
Qty: 0 | Refills: 0 | DISCHARGE
Start: 2025-05-01

## 2025-05-01 RX ADMIN — Medication 3 MILLILITER(S): at 05:21

## 2025-05-01 NOTE — PROGRESS NOTE ADULT - ASSESSMENT
A/P: 40yo PPD #9 s/p  admitted with PP sPEC.  Patient is stable without symptoms at this time.    #PP sPEC  - Pt met criteria due to headache and vision changes which have both resolved  - s/p Mg  - Continue Procardia 30 XL daily  - HELLP labs wnl    #Postpartum recovery from vaginal delivery  - Pain well controlled, continue current pain regimen. Standing Ibuprofen, Acetaminophen. Oxycodone available PRN for breakthrough pain.  - Increase ambulation, SCDs when not ambulating  - Continue regular diet    Tiara Pedraza PGY3

## 2025-05-01 NOTE — DISCHARGE NOTE NURSING/CASE MANAGEMENT/SOCIAL WORK - NSFLUVACAGEDISCH_IMM_ALL_CORE
Adult

## 2025-05-01 NOTE — DISCHARGE NOTE NURSING/CASE MANAGEMENT/SOCIAL WORK - PATIENT PORTAL LINK FT
You can access the FollowMyHealth Patient Portal offered by [facility] by registering at the following website: [email or web address]. By joining [employer]’s FollowMyHealth portal, you will also be able to view your health information using other applications (apps) compatible with our system.

## 2025-05-01 NOTE — DISCHARGE NOTE NURSING/CASE MANAGEMENT/SOCIAL WORK - FINANCIAL ASSISTANCE
[facility] provides services at a reduced cost to those who are determined to be eligible through [facility]’s financial assistance program. Information regarding [facility]’s financial assistance program can be found by going to [email or web address] or by calling [phone].

## 2025-05-01 NOTE — PROGRESS NOTE ADULT - SUBJECTIVE AND OBJECTIVE BOX
Pt seen and examined at bedside. Patient denies current complaints. She denies CP, SOB, N/V, HA, blurred vision, epigastric pain.    Vital Signs Last 24 Hours  T(C): 36.9 (05-01-25 @ 09:45), Max: 37.5 (05-01-25 @ 06:00)  HR: 64 (05-01-25 @ 11:33) (62 - 98)  BP: 113/71 (05-01-25 @ 11:33) (113/62 - 130/69)  RR: 12 (05-01-25 @ 11:33) (12 - 18)  SpO2: 100% (05-01-25 @ 11:33) (99% - 100%)    Physical Exam:  General: NAD, resting comfortably in bed   Abdomen: Soft, non-tender, non-distended, fundus firm  Extremities: Full ROM, moving all extremities spontaneously  Pelvic: Lochia wnl    Labs:    Blood Type: A Positive  Antibody Screen: Negative  RPR: Negative               15.2   8.37  )-----------( 238      ( 04-30 @ 06:00 )             45.9                14.1   8.86  )-----------( 237      ( 04-29 @ 16:56 )             43.2                12.3   11.42 )-----------( 183      ( 04-24 @ 05:55 )             36.9         MEDICATIONS  (STANDING):  acetaminophen     Tablet .. 975 milliGRAM(s) Oral <User Schedule>  acetaminophen   IVPB .. 1000 milliGRAM(s) IV Intermittent once  heparin   Injectable 5000 Unit(s) SubCutaneous every 12 hours  ibuprofen  Tablet. 600 milliGRAM(s) Oral every 6 hours  influenza   Vaccine 0.5 milliLiter(s) IntraMuscular once  lactated ringers. 1000 milliLiter(s) (50 mL/Hr) IV Continuous <Continuous>  magnesium sulfate Infusion 2 Gm/Hr (50 mL/Hr) IV Continuous <Continuous>  NIFEdipine XL 30 milliGRAM(s) Oral daily  prenatal multivitamin 1 Tablet(s) Oral daily  sodium chloride 0.9% lock flush 3 milliLiter(s) IV Push every 8 hours    MEDICATIONS  (PRN):  benzocaine 20%/menthol 0.5% Spray 1 Spray(s) Topical every 6 hours PRN for Perineal discomfort  dibucaine 1% Ointment 1 Application(s) Topical every 6 hours PRN Perineal discomfort  hydrocortisone 1% Cream 1 Application(s) Topical every 6 hours PRN Moderate Pain (4-6)  lanolin Ointment 1 Application(s) Topical every 6 hours PRN nipple soreness  magnesium hydroxide Suspension 30 milliLiter(s) Oral two times a day PRN Constipation  pramoxine 1%/zinc 5% Cream 1 Application(s) Topical every 4 hours PRN Moderate Pain (4-6)  simethicone 80 milliGRAM(s) Chew every 4 hours PRN Gas  witch hazel Pads 1 Application(s) Topical every 4 hours PRN Perineal discomfort   Pt seen and examined at bedside. Overall, she feels well. However when she has the urge to void, she experiences pain in her lower abdomen near her bladder. She does not have frequency, dysuria, or hematuria. She denies CP, SOB, N/V, HA, blurred vision, epigastric pain.    Vital Signs Last 24 Hours  T(C): 36.9 (05-01-25 @ 09:45), Max: 37.5 (05-01-25 @ 06:00)  HR: 64 (05-01-25 @ 11:33) (62 - 98)  BP: 113/71 (05-01-25 @ 11:33) (113/62 - 130/69)  RR: 12 (05-01-25 @ 11:33) (12 - 18)  SpO2: 100% (05-01-25 @ 11:33) (99% - 100%)    Physical Exam:  General: NAD, resting comfortably in bed   Abdomen: Soft, non-tender, non-distended, fundus firm  Extremities: Full ROM, moving all extremities spontaneously  Pelvic: Lochia wnl    Labs:    Blood Type: A Positive  Antibody Screen: Negative  RPR: Negative               15.2   8.37  )-----------( 238      ( 04-30 @ 06:00 )             45.9                14.1   8.86  )-----------( 237      ( 04-29 @ 16:56 )             43.2                12.3   11.42 )-----------( 183      ( 04-24 @ 05:55 )             36.9         MEDICATIONS  (STANDING):  acetaminophen     Tablet .. 975 milliGRAM(s) Oral <User Schedule>  acetaminophen   IVPB .. 1000 milliGRAM(s) IV Intermittent once  heparin   Injectable 5000 Unit(s) SubCutaneous every 12 hours  ibuprofen  Tablet. 600 milliGRAM(s) Oral every 6 hours  influenza   Vaccine 0.5 milliLiter(s) IntraMuscular once  lactated ringers. 1000 milliLiter(s) (50 mL/Hr) IV Continuous <Continuous>  magnesium sulfate Infusion 2 Gm/Hr (50 mL/Hr) IV Continuous <Continuous>  NIFEdipine XL 30 milliGRAM(s) Oral daily  prenatal multivitamin 1 Tablet(s) Oral daily  sodium chloride 0.9% lock flush 3 milliLiter(s) IV Push every 8 hours    MEDICATIONS  (PRN):  benzocaine 20%/menthol 0.5% Spray 1 Spray(s) Topical every 6 hours PRN for Perineal discomfort  dibucaine 1% Ointment 1 Application(s) Topical every 6 hours PRN Perineal discomfort  hydrocortisone 1% Cream 1 Application(s) Topical every 6 hours PRN Moderate Pain (4-6)  lanolin Ointment 1 Application(s) Topical every 6 hours PRN nipple soreness  magnesium hydroxide Suspension 30 milliLiter(s) Oral two times a day PRN Constipation  pramoxine 1%/zinc 5% Cream 1 Application(s) Topical every 4 hours PRN Moderate Pain (4-6)  simethicone 80 milliGRAM(s) Chew every 4 hours PRN Gas  witch hazel Pads 1 Application(s) Topical every 4 hours PRN Perineal discomfort